# Patient Record
Sex: MALE | Race: WHITE | NOT HISPANIC OR LATINO | Employment: UNEMPLOYED | URBAN - METROPOLITAN AREA
[De-identification: names, ages, dates, MRNs, and addresses within clinical notes are randomized per-mention and may not be internally consistent; named-entity substitution may affect disease eponyms.]

---

## 2023-08-11 ENCOUNTER — APPOINTMENT (OUTPATIENT)
Dept: CARDIOLOGY | Facility: HOSPITAL | Age: 63
End: 2023-08-11
Payer: MEDICAID

## 2023-08-11 ENCOUNTER — HOSPITAL ENCOUNTER (INPATIENT)
Facility: HOSPITAL | Age: 63
LOS: 1 days | Discharge: HOME | End: 2023-08-12
Attending: INTERNAL MEDICINE | Admitting: INTERNAL MEDICINE
Payer: MEDICAID

## 2023-08-11 PROBLEM — I73.9 PAD (PERIPHERAL ARTERY DISEASE): Status: ACTIVE | Noted: 2023-08-11

## 2023-08-11 PROBLEM — I21.3 STEMI (ST ELEVATION MYOCARDIAL INFARCTION): Status: ACTIVE | Noted: 2023-08-11

## 2023-08-11 PROBLEM — I21.21 STEMI INVOLVING LEFT CIRCUMFLEX CORONARY ARTERY: Status: ACTIVE | Noted: 2023-08-11

## 2023-08-11 PROBLEM — F17.200 SMOKER: Status: ACTIVE | Noted: 2023-08-11

## 2023-08-11 PROBLEM — E78.2 MIXED HYPERLIPIDEMIA: Status: ACTIVE | Noted: 2023-08-11

## 2023-08-11 PROBLEM — I10 PRIMARY HYPERTENSION: Status: ACTIVE | Noted: 2023-08-11

## 2023-08-11 LAB
ACT BLD: 227 SECONDS (ref 89–137)
ACT BLD: 263 SECONDS (ref 89–137)
ALBUMIN SERPL-MCNC: 3.9 G/DL (ref 3.5–5.2)
ALBUMIN/GLOB SERPL: 1.2 G/DL
ALP SERPL-CCNC: 111 U/L (ref 39–117)
ALT SERPL W P-5'-P-CCNC: 41 U/L (ref 1–41)
ANION GAP SERPL CALCULATED.3IONS-SCNC: 11 MMOL/L (ref 5–15)
AST SERPL-CCNC: 200 U/L (ref 1–40)
BASOPHILS # BLD AUTO: 0 10*3/MM3 (ref 0–0.2)
BASOPHILS NFR BLD AUTO: 0.2 % (ref 0–1.5)
BILIRUB SERPL-MCNC: 0.4 MG/DL (ref 0–1.2)
BUN SERPL-MCNC: 11 MG/DL (ref 8–23)
BUN/CREAT SERPL: 10.2 (ref 7–25)
CALCIUM SPEC-SCNC: 9.3 MG/DL (ref 8.6–10.5)
CHLORIDE SERPL-SCNC: 102 MMOL/L (ref 98–107)
CHOLEST SERPL-MCNC: 136 MG/DL (ref 0–200)
CO2 SERPL-SCNC: 23 MMOL/L (ref 22–29)
CREAT SERPL-MCNC: 1.08 MG/DL (ref 0.76–1.27)
DEPRECATED RDW RBC AUTO: 49.4 FL (ref 37–54)
EGFRCR SERPLBLD CKD-EPI 2021: 76.6 ML/MIN/1.73
EOSINOPHIL # BLD AUTO: 0 10*3/MM3 (ref 0–0.4)
EOSINOPHIL NFR BLD AUTO: 0.1 % (ref 0.3–6.2)
ERYTHROCYTE [DISTWIDTH] IN BLOOD BY AUTOMATED COUNT: 14.6 % (ref 12.3–15.4)
GLOBULIN UR ELPH-MCNC: 3.3 GM/DL
GLUCOSE SERPL-MCNC: 122 MG/DL (ref 65–99)
HBA1C MFR BLD: 5.6 % (ref 4.8–5.6)
HCT VFR BLD AUTO: 40.9 % (ref 37.5–51)
HDLC SERPL-MCNC: 44 MG/DL (ref 40–60)
HGB BLD-MCNC: 13.5 G/DL (ref 13–17.7)
LDLC SERPL CALC-MCNC: 81 MG/DL (ref 0–100)
LDLC/HDLC SERPL: 1.87 {RATIO}
LYMPHOCYTES # BLD AUTO: 1.1 10*3/MM3 (ref 0.7–3.1)
LYMPHOCYTES NFR BLD AUTO: 8.5 % (ref 19.6–45.3)
MCH RBC QN AUTO: 32.1 PG (ref 26.6–33)
MCHC RBC AUTO-ENTMCNC: 32.9 G/DL (ref 31.5–35.7)
MCV RBC AUTO: 97.7 FL (ref 79–97)
MONOCYTES # BLD AUTO: 0.5 10*3/MM3 (ref 0.1–0.9)
MONOCYTES NFR BLD AUTO: 4.1 % (ref 5–12)
NEUTROPHILS NFR BLD AUTO: 11.5 10*3/MM3 (ref 1.7–7)
NEUTROPHILS NFR BLD AUTO: 87.1 % (ref 42.7–76)
NRBC BLD AUTO-RTO: 0 /100 WBC (ref 0–0.2)
PLATELET # BLD AUTO: 211 10*3/MM3 (ref 140–450)
PMV BLD AUTO: 9.6 FL (ref 6–12)
POTASSIUM SERPL-SCNC: 4.9 MMOL/L (ref 3.5–5.2)
PROT SERPL-MCNC: 7.2 G/DL (ref 6–8.5)
RBC # BLD AUTO: 4.19 10*6/MM3 (ref 4.14–5.8)
SODIUM SERPL-SCNC: 136 MMOL/L (ref 136–145)
TRIGL SERPL-MCNC: 48 MG/DL (ref 0–150)
TSH SERPL DL<=0.05 MIU/L-ACNC: 1.1 UIU/ML (ref 0.27–4.2)
VLDLC SERPL-MCNC: 11 MG/DL (ref 5–40)
WBC NRBC COR # BLD: 13.2 10*3/MM3 (ref 3.4–10.8)

## 2023-08-11 PROCEDURE — 80061 LIPID PANEL: CPT | Performed by: INTERNAL MEDICINE

## 2023-08-11 PROCEDURE — C1753 CATH, INTRAVAS ULTRASOUND: HCPCS | Performed by: INTERNAL MEDICINE

## 2023-08-11 PROCEDURE — B240ZZ3 ULTRASONOGRAPHY OF SINGLE CORONARY ARTERY, INTRAVASCULAR: ICD-10-PCS | Performed by: INTERNAL MEDICINE

## 2023-08-11 PROCEDURE — 92978 ENDOLUMINL IVUS OCT C 1ST: CPT | Performed by: INTERNAL MEDICINE

## 2023-08-11 PROCEDURE — 99153 MOD SED SAME PHYS/QHP EA: CPT | Performed by: INTERNAL MEDICINE

## 2023-08-11 PROCEDURE — C1894 INTRO/SHEATH, NON-LASER: HCPCS | Performed by: INTERNAL MEDICINE

## 2023-08-11 PROCEDURE — 25010000002 SULFUR HEXAFLUORIDE MICROSPH 60.7-25 MG RECONSTITUTED SUSPENSION: Performed by: INTERNAL MEDICINE

## 2023-08-11 PROCEDURE — C1769 GUIDE WIRE: HCPCS | Performed by: INTERNAL MEDICINE

## 2023-08-11 PROCEDURE — 83036 HEMOGLOBIN GLYCOSYLATED A1C: CPT | Performed by: INTERNAL MEDICINE

## 2023-08-11 PROCEDURE — 4A023N7 MEASUREMENT OF CARDIAC SAMPLING AND PRESSURE, LEFT HEART, PERCUTANEOUS APPROACH: ICD-10-PCS | Performed by: INTERNAL MEDICINE

## 2023-08-11 PROCEDURE — 25010000002 ATROPINE SULFATE 1 MG/10ML SOLUTION PREFILLED SYRINGE: Performed by: INTERNAL MEDICINE

## 2023-08-11 PROCEDURE — 25010000002 FUROSEMIDE PER 20 MG: Performed by: NURSE PRACTITIONER

## 2023-08-11 PROCEDURE — 85347 COAGULATION TIME ACTIVATED: CPT

## 2023-08-11 PROCEDURE — 93458 L HRT ARTERY/VENTRICLE ANGIO: CPT | Performed by: INTERNAL MEDICINE

## 2023-08-11 PROCEDURE — C1760 CLOSURE DEV, VASC: HCPCS | Performed by: INTERNAL MEDICINE

## 2023-08-11 PROCEDURE — 93306 TTE W/DOPPLER COMPLETE: CPT | Performed by: INTERNAL MEDICINE

## 2023-08-11 PROCEDURE — 99292 CRITICAL CARE ADDL 30 MIN: CPT | Performed by: INTERNAL MEDICINE

## 2023-08-11 PROCEDURE — B41F1ZZ FLUOROSCOPY OF RIGHT LOWER EXTREMITY ARTERIES USING LOW OSMOLAR CONTRAST: ICD-10-PCS | Performed by: INTERNAL MEDICINE

## 2023-08-11 PROCEDURE — C1874 STENT, COATED/COV W/DEL SYS: HCPCS | Performed by: INTERNAL MEDICINE

## 2023-08-11 PROCEDURE — 0 LIDOCAINE 1 % SOLUTION: Performed by: INTERNAL MEDICINE

## 2023-08-11 PROCEDURE — 93306 TTE W/DOPPLER COMPLETE: CPT

## 2023-08-11 PROCEDURE — 99152 MOD SED SAME PHYS/QHP 5/>YRS: CPT | Performed by: INTERNAL MEDICINE

## 2023-08-11 PROCEDURE — 25010000002 HEPARIN (PORCINE) PER 1000 UNITS: Performed by: INTERNAL MEDICINE

## 2023-08-11 PROCEDURE — 92943 PRQ TRLUML REVSC CH OCC ANT: CPT | Performed by: INTERNAL MEDICINE

## 2023-08-11 PROCEDURE — G0278 ILIAC ART ANGIO,CARDIAC CATH: HCPCS | Performed by: INTERNAL MEDICINE

## 2023-08-11 PROCEDURE — 80050 GENERAL HEALTH PANEL: CPT | Performed by: INTERNAL MEDICINE

## 2023-08-11 PROCEDURE — C9606 PERC D-E COR REVASC W AMI S: HCPCS | Performed by: INTERNAL MEDICINE

## 2023-08-11 PROCEDURE — C1725 CATH, TRANSLUMIN NON-LASER: HCPCS | Performed by: INTERNAL MEDICINE

## 2023-08-11 PROCEDURE — 25510000001 IOPAMIDOL PER 1 ML: Performed by: INTERNAL MEDICINE

## 2023-08-11 PROCEDURE — 99291 CRITICAL CARE FIRST HOUR: CPT | Performed by: INTERNAL MEDICINE

## 2023-08-11 PROCEDURE — 25010000002 PHENYLEPHRINE 10 MG/ML SOLUTION: Performed by: INTERNAL MEDICINE

## 2023-08-11 PROCEDURE — B2111ZZ FLUOROSCOPY OF MULTIPLE CORONARY ARTERIES USING LOW OSMOLAR CONTRAST: ICD-10-PCS | Performed by: INTERNAL MEDICINE

## 2023-08-11 PROCEDURE — 92941 PRQ TRLML REVSC TOT OCCL AMI: CPT | Performed by: INTERNAL MEDICINE

## 2023-08-11 PROCEDURE — 02713EZ DILATION OF CORONARY ARTERY, TWO ARTERIES WITH TWO INTRALUMINAL DEVICES, PERCUTANEOUS APPROACH: ICD-10-PCS | Performed by: INTERNAL MEDICINE

## 2023-08-11 PROCEDURE — C1887 CATHETER, GUIDING: HCPCS | Performed by: INTERNAL MEDICINE

## 2023-08-11 DEVICE — XIENCE SKYPOINT™ EVEROLIMUS ELUTING CORONARY STENT SYSTEM 3.00 MM X 23 MM / RAPID-EXCHANGE
Type: IMPLANTABLE DEVICE | Site: CORONARY | Status: FUNCTIONAL
Brand: XIENCE SKYPOINT™

## 2023-08-11 RX ORDER — ATROPINE SULFATE 0.1 MG/ML
INJECTION INTRAVENOUS
Status: DISCONTINUED | OUTPATIENT
Start: 2023-08-11 | End: 2023-08-11 | Stop reason: HOSPADM

## 2023-08-11 RX ORDER — DIPHENHYDRAMINE HCL 25 MG
25 CAPSULE ORAL EVERY 6 HOURS PRN
Status: DISCONTINUED | OUTPATIENT
Start: 2023-08-11 | End: 2023-08-12 | Stop reason: HOSPADM

## 2023-08-11 RX ORDER — PHENYLEPHRINE HYDROCHLORIDE 10 MG/ML
INJECTION INTRAVENOUS
Status: DISCONTINUED | OUTPATIENT
Start: 2023-08-11 | End: 2023-08-11 | Stop reason: HOSPADM

## 2023-08-11 RX ORDER — NITROGLYCERIN 0.4 MG/1
0.4 TABLET SUBLINGUAL
Status: DISCONTINUED | OUTPATIENT
Start: 2023-08-11 | End: 2023-08-12 | Stop reason: HOSPADM

## 2023-08-11 RX ORDER — ONDANSETRON 4 MG/1
4 TABLET, FILM COATED ORAL EVERY 6 HOURS PRN
Status: DISCONTINUED | OUTPATIENT
Start: 2023-08-11 | End: 2023-08-12 | Stop reason: HOSPADM

## 2023-08-11 RX ORDER — ONDANSETRON 2 MG/ML
4 INJECTION INTRAMUSCULAR; INTRAVENOUS EVERY 6 HOURS PRN
Status: DISCONTINUED | OUTPATIENT
Start: 2023-08-11 | End: 2023-08-12 | Stop reason: HOSPADM

## 2023-08-11 RX ORDER — LIDOCAINE HYDROCHLORIDE 10 MG/ML
INJECTION, SOLUTION INFILTRATION; PERINEURAL
Status: DISCONTINUED | OUTPATIENT
Start: 2023-08-11 | End: 2023-08-11 | Stop reason: HOSPADM

## 2023-08-11 RX ORDER — ROSUVASTATIN CALCIUM 10 MG/1
20 TABLET, COATED ORAL NIGHTLY
Status: DISCONTINUED | OUTPATIENT
Start: 2023-08-11 | End: 2023-08-12 | Stop reason: HOSPADM

## 2023-08-11 RX ORDER — ASPIRIN 81 MG/1
81 TABLET, CHEWABLE ORAL DAILY
Status: DISCONTINUED | OUTPATIENT
Start: 2023-08-11 | End: 2023-08-12 | Stop reason: HOSPADM

## 2023-08-11 RX ORDER — NAPROXEN 500 MG/1
500 TABLET ORAL 2 TIMES DAILY WITH MEALS
COMMUNITY
End: 2023-08-12 | Stop reason: HOSPADM

## 2023-08-11 RX ORDER — HEPARIN SODIUM 1000 [USP'U]/ML
INJECTION, SOLUTION INTRAVENOUS; SUBCUTANEOUS
Status: DISCONTINUED | OUTPATIENT
Start: 2023-08-11 | End: 2023-08-11 | Stop reason: HOSPADM

## 2023-08-11 RX ORDER — FUROSEMIDE 10 MG/ML
40 INJECTION INTRAMUSCULAR; INTRAVENOUS DAILY
Status: DISCONTINUED | OUTPATIENT
Start: 2023-08-11 | End: 2023-08-12

## 2023-08-11 RX ORDER — METOPROLOL SUCCINATE 25 MG/1
25 TABLET, EXTENDED RELEASE ORAL
Status: DISCONTINUED | OUTPATIENT
Start: 2023-08-11 | End: 2023-08-12 | Stop reason: HOSPADM

## 2023-08-11 RX ORDER — ACETAMINOPHEN 325 MG/1
650 TABLET ORAL EVERY 4 HOURS PRN
Status: DISCONTINUED | OUTPATIENT
Start: 2023-08-11 | End: 2023-08-12 | Stop reason: HOSPADM

## 2023-08-11 RX ORDER — SODIUM CHLORIDE 9 MG/ML
INJECTION, SOLUTION INTRAVENOUS
Status: DISCONTINUED | OUTPATIENT
Start: 2023-08-11 | End: 2023-08-11

## 2023-08-11 RX ADMIN — TICAGRELOR 90 MG: 90 TABLET ORAL at 20:21

## 2023-08-11 RX ADMIN — ROSUVASTATIN 20 MG: 10 TABLET, FILM COATED ORAL at 20:21

## 2023-08-11 RX ADMIN — ASPIRIN 81 MG CHEWABLE TABLET 81 MG: 81 TABLET CHEWABLE at 08:48

## 2023-08-11 RX ADMIN — SULFUR HEXAFLUORIDE 2 ML: KIT at 07:54

## 2023-08-11 RX ADMIN — FUROSEMIDE 40 MG: 10 INJECTION, SOLUTION INTRAMUSCULAR; INTRAVENOUS at 15:49

## 2023-08-11 RX ADMIN — METOPROLOL SUCCINATE 25 MG: 25 TABLET, EXTENDED RELEASE ORAL at 15:49

## 2023-08-11 NOTE — Clinical Note
Hemostasis started on the left femoral artery. Angio-Seal was used in achieving hemostasis. Closure device deployed in the vessel and manual pressure applied to vessel. Manual pressure was held by MD MARIALUISA. Manual pressure was held for 5 min. Hemostasis achieved successfully.

## 2023-08-11 NOTE — CASE MANAGEMENT/SOCIAL WORK
Discharge Planning Assessment   Dov     Patient Name: Eber Garza  MRN: [id number]  Today's Date: 8/11/2023    Admit Date: 8/11/2023    Plan: Anticipate return home with family.   Discharge Needs Assessment       Row Name 08/11/23 1401       Living Environment    People in Home grandchild(nino)    Current Living Arrangements home    Potentially Unsafe Housing Conditions none    Primary Care Provided by self    Provides Primary Care For no one    Family Caregiver if Needed child(nino), adult    Quality of Family Relationships supportive    Able to Return to Prior Arrangements yes       Resource/Environmental Concerns    Resource/Environmental Concerns none    Transportation Concerns none       Transition Planning    Patient/Family Anticipates Transition to home with family    Patient/Family Anticipated Services at Transition none    Transportation Anticipated family or friend will provide       Discharge Needs Assessment    Readmission Within the Last 30 Days no previous admission in last 30 days    Equipment Currently Used at Home none    Concerns to be Addressed discharge planning    Anticipated Changes Related to Illness none    Equipment Needed After Discharge none                   Discharge Plan       Row Name 08/11/23 1402       Plan    Plan Anticipate return home with family.    Patient/Family in Agreement with Plan yes    Plan Comments Met with pt in room regarding d/c planning. Pt lives at home with his grandson. Pt is IADLs and drives. Denies use of DME. Pt son or grandson will provide transportation at d/c. Pt is uninsured and without PCP. MSW notified.                  Continued Care and Services - Admitted Since 8/11/2023           Expected Discharge Date and Time       Expected Discharge Date Expected Discharge Time    Aug 14, 2023                Functional Status       Row Name 08/11/23 1401       Functional Status    Usual Activity Tolerance good    Current Activity Tolerance good       Functional  Status, IADL    Medications independent    Meal Preparation independent    Housekeeping independent    Laundry independent    Shopping independent       Mental Status    General Appearance WDL WDL       Mental Status Summary    Recent Changes in Mental Status/Cognitive Functioning no changes                            Sera Mendoza RN

## 2023-08-11 NOTE — Clinical Note
First balloon inflation max pressure = 14 chapincito. First balloon inflation duration = 12 seconds.

## 2023-08-11 NOTE — CASE MANAGEMENT/SOCIAL WORK
Social Work Assessment  [facility]     Patient Name: Eber Garza  MRN: [id number]  Today's Date: 8/11/2023    Admit Date: 8/11/2023     Demographic Summary       Row Name 08/11/23 0529       General Information    Reason for Consult care coordination/care conference;insurance concerns;medication concerns    General Information Comments SW was consulted re: no health coverage. MNÓICA sent EM to MA with request to screen and it appears pt is eligible for MKD, application is pending receipt of docs. Pt informed this writer his son will gather docs and bring to hospital. SW instructed pt to notify the nurse when they are available so MA can be contacted, pt verbalized understanding. Pt denies having a PCP and reports interest in an appt with Yee Jimenez; however, due to no health coverage, is agreeable for [facility] at this time. Appt made and placed on AVS. Pt is unsure of his ability to afford d/c meds - pt agreeable to [facility], pharmacy updated in [facility], notified of possible need for financial assistance. Pt is unemployed, receives survivor's benefits. He drives, but his son will provide transportation at discharge. In re: to smoking in the setting of MI - pt reports smoking 1ppd, having started at 13 y.o. He wants to quit on his own but is aware of [employer] resource, if needed.           LAVELLE Sarmiento, \[facility] [place] [facility]\""  Medical Social Worker  Ph [phone]  Fax [phone]  [email or web address] Flag

## 2023-08-11 NOTE — Clinical Note
Hemostasis started on the right femoral artery. Manual pressure applied to vessel. Manual pressure was held by MD MARIALUISA. Manual pressure was held for 5 min. Hemostasis achieved successfully.

## 2023-08-11 NOTE — H&P
Referring Provider: Francisco Moreno MD    This is a admission history and physical      SUBJECTIVE     Chief Complaint: Chest pain/STEMI    History of present illness:  Eber Garza is a 62 y.o. male with no known medical history other than hypertension for which he takes lisinopril and significant family history of heart disease in his father who presented to the outside hospital with complaints of chest pain and was diagnosed with lateral STEMI.  He also has ischemic changes in the anterior and inferior leads.  He was emergently transferred to [facility] where an urgent discussion was held with the patient.  We emergently proceeded with cardiac catheterization and primary PCI.    Review of systems:    Constitutional: No weakness, fatigue, fever, rigors, chills   Eyes: No vision changes, eye pain   ENT/oropharynx: No difficulty swallowing, sore throat, epistaxis, changes in hearing   Cardiovascular: + chest pain, chest tightness, palpitations, paroxysmal nocturnal dyspnea, orthopnea, diaphoresis, dizziness / syncopal episode   Respiratory: No shortness of breath, + dyspnea on exertion, cough, wheezing, hemoptysis   Gastrointestinal: No abdominal pain, nausea, vomiting, diarrhea, bloody stools   Genitourinary: No hematuria, dysuria   Neurological: No headache, tremors, numbness, one-sided weakness    Musculoskeletal: No cramps, myalgias, joint pain, joint swelling   Integument: No rash, edema        Personal History:        Past medical history  Hypertension    Past surgical history  Right hip fracture repair    Family history  Father  of heart heart attack     Home medications  Lisinopril     Allergies:     Patient has no known allergies.    Scheduled Meds:  Continuous Infusions:     PRN Meds:      OBJECTIVE    Vital Signs  Vitals:    23 0552 23 0617 23 0623 23 0639   BP: 135/82 115/73 (!) 86/49 108/72   Pulse: 84 69 (!) 43 118   Resp: 20   15   SpO2: 93% 95% 100% 95%   Weight: 68 kg  "(150 lb)      Height: 175.3 cm (69\")              No intake or output data in the 24 hours ending 08/11/23 0703     Telemetry: Sinus bradycardia    Physical Exam:  The patient is alert, oriented and in no distress.  Vital signs as noted above.  Head and neck revealed no carotid bruits or jugular venous distention.  No thyromegaly or lymphadenopathy is present  Lungs clear.  No wheezing.  Breath sounds are normal bilaterally.  Heart: Normal first and second heart sounds. No murmur.  No precordial rub is present.  No gallop is present.  Abdomen: Soft and nontender.  No organomegaly is present.  Extremities with good peripheral pulses without any pedal edema.  Skin: Warm and dry.  Musculoskeletal system is grossly normal.  CNS grossly normal.       Results Review:  I have personally reviewed the results from the time of this admission to 8/11/2023 07:03 EDT and agree with these findings:  []  Laboratory  []  Microbiology  []  Radiology  []  EKG/Telemetry   []  Cardiology/Vascular   []  Pathology  []  Old records  []  Other:    Most notable findings include:     Lab Results (last 24 hours)       ** No results found for the last 24 hours. **            Imaging Results (Last 24 Hours)       ** No results found for the last 24 hours. **            LAB RESULTS (LAST 7 DAYS)    CBC        BMP        CMP         BNP        TROPONIN        CoAg        Creatinine Clearance  CrCl cannot be calculated (No successful lab value found.).    ABG          Radiology  No radiology results for the last day      EKG  I personally viewed and interpreted the patient's EKG/Telemetry data:  No orders to display         Echocardiogram:          Stress Test:        Cardiac Catheterization:  No results found for this or any previous visit.        Other:      ASSESSMENT & PLAN:    Principal Problem:    STEMI (ST elevation myocardial infarction)  Active Problems:    STEMI involving left circumflex coronary artery    Primary hypertension    Mixed " hyperlipidemia    PAD (peripheral artery disease)    Smoker    STEMI/coronary artery disease  Patient presented with acute ST elevation MI involving the lateral leads with ischemic changes in anterior and inferior leads.  After emergent discussion about risk and benefit of the procedure he was taken to the Cath Lab.  Cardiac catheterization showed 100% occlusion of LAD and circumflex.  IVUS guided PCI and BRIT placement to left circumflex was successfully performed.  Unsuccessful attempt towards  PCI of LAD which receives collaterals from the RCA.  He will be transferred to CCU  We will start dual antiplatelet therapy.  Start high intensity statin.  Obtain an echocardiogram.  We will start beta-blocker and ACE inhibitor depending on his heart rate and blood pressure.  Of note during the cardiac catheterization he became hypotensive and bradycardic requiring phenylephrine and atropine injections.  He will need cardiac rehab at the time of discharge    Hypertension  Typically takes lisinopril.  We will restart depending on his blood pressure response.    Hyperlipidemia  Start high intensity statin.  Obtain a lipid panel.    Peripheral arterial disease  During cardiac catheterization it was noted that he has complete occlusion of right common iliac.  Will need to follow-up with vascular surgery regarding possible revascularization options as the patient does complain of claudications.  In the meantime he will continue dual antiplatelet therapy, high intensity statin.    Smoker  He has been smoking a pack a day for more than 40 years.  Smoking cessation counseling provided to the patient    Addendum:  Echocardiogram shows EF of less than 20% with severely dilated left ventricle.  He also has moderate to severe mitral regurgitation.  Start IV diuretics.  Start Toprol-XL.  Monitor I's and O's and replace electrolytes as needed.  Continue telemonitoring for episodes of bradycardia while on Toprol-XL.  Will start GDMT  depending on his progress    Critical care time 90 minutes.  This time includes emergent evaluation of patient in the emergency room and discussion regarding risk and benefit of emergent cardiac catheterization and PCI.  This also involves dealing with high risk medications including heparin, Integrilin.  This time includes postprocedure management of the patient which includes administering atropine for marked bradycardia and giving phenylephrine for marked hypotension postprocedure.  Postprocedure management of the patient in the CCU is also included in this time.    Francisco Moreno MD  08/11/23  07:03 EDT

## 2023-08-11 NOTE — PAYOR COMM NOTE
"  PRESUMPTIVE ELIGIBILITY PATIENT - NOTIFICATION OF ADMISSION  ---- Resubmission - [facility] unable to find in system previously and returned fax submission x 2..      Ching Lombardo (62 y.o. Male)       Date of Birth   1960    Social Security Number       Address   [address] [place] [address] [address] [place] [address]    Home Phone   [phone]    MRN   [id number]       Mu-ism   None    Marital Status   Significant Other                            Admission Date   8/11/23    Admission Type   Elective    Admitting Provider   Francisco Moreno MD    Attending Provider   Francisco Moreno MD    Department, Room/Bed   [facility] CARDIOVASCULAR CARE UNIT, 2206/1       Discharge Date       Discharge Disposition       Discharge Destination                                 Attending Provider: Francisco Moreno MD    Allergies: No Known Allergies    Isolation: None   Infection: None   Code Status: CPR    Ht: 175.3 cm (69\")   Wt: 68 kg (150 lb)    Admission Cmt: None   Principal Problem: STEMI (ST elevation myocardial infarction) [I21.3]                   Active Insurance as of 8/11/2023       Primary Coverage       Payor Plan Insurance Group Employer/Plan Group    [place]        Payor Plan Address Payor Plan Phone Number Payor Plan Fax Number Effective Dates    [address]   8/11/2023 - None Entered    [place] [address]         Subscriber Name Subscriber Birth Date Member ID       CHING LOMBADRO 1960 [id number]                   "

## 2023-08-11 NOTE — PLAN OF CARE
Problem: Adult Inpatient Plan of Care  Goal: Plan of Care Review  Outcome: Ongoing, Progressing  Flowsheets (Taken 8/11/2023 1346)  Plan of Care Reviewed With: patient  Goal: Patient-Specific Goal (Individualized)  Outcome: Ongoing, Progressing  Goal: Absence of Hospital-Acquired Illness or Injury  Outcome: Ongoing, Progressing  Intervention: Identify and Manage Fall Risk  Recent Flowsheet Documentation  Taken 8/11/2023 1315 by Juliet Christian RN  Safety Promotion/Fall Prevention: safety round/check completed  Taken 8/11/2023 1228 by Juliet Christian RN  Safety Promotion/Fall Prevention: safety round/check completed  Taken 8/11/2023 1130 by Juliet Christian RN  Safety Promotion/Fall Prevention: safety round/check completed  Taken 8/11/2023 1015 by Juliet Christian RN  Safety Promotion/Fall Prevention: safety round/check completed  Taken 8/11/2023 0915 by Juliet Christian RN  Safety Promotion/Fall Prevention: safety round/check completed  Taken 8/11/2023 0815 by Juliet Christian RN  Safety Promotion/Fall Prevention: safety round/check completed  Intervention: Prevent Skin Injury  Recent Flowsheet Documentation  Taken 8/11/2023 1315 by Juliet Christian RN  Body Position: position changed independently  Taken 8/11/2023 1228 by Juliet Christian RN  Body Position: position changed independently  Taken 8/11/2023 1130 by Juliet Christian RN  Body Position: position changed independently  Taken 8/11/2023 1015 by Juliet Christian RN  Body Position: position changed independently  Taken 8/11/2023 0915 by Juliet Christian RN  Body Position: position changed independently  Intervention: Prevent and Manage VTE (Venous Thromboembolism) Risk  Recent Flowsheet Documentation  Taken 8/11/2023 1228 by Juliet Christain RN  Range of Motion: active ROM (range of motion) encouraged  Taken 8/11/2023 1130 by Juliet Christian RN  Activity Management: ambulated to bathroom  Taken 8/11/2023 1015 by Juliet Christian RN  Activity Management: up in  chair  Taken 8/11/2023 0915 by Juliet Christian, RN  Activity Management: up in chair  Intervention: Prevent Infection  Recent Flowsheet Documentation  Taken 8/11/2023 1228 by Juliet Christian RN  Infection Prevention: hand hygiene promoted  Goal: Optimal Comfort and Wellbeing  Outcome: Ongoing, Progressing  Intervention: Provide Person-Centered Care  Recent Flowsheet Documentation  Taken 8/11/2023 1228 by Juliet Christian RN  Trust Relationship/Rapport: care explained  Taken 8/11/2023 0815 by Juliet Christian RN  Trust Relationship/Rapport: care explained  Goal: Readiness for Transition of Care  Outcome: Ongoing, Progressing  Intervention: Mutually Develop Transition Plan  Recent Flowsheet Documentation  Taken 8/11/2023 1105 by Juliet Christian RN  Equipment Currently Used at Home: none  Transportation Anticipated: family or friend will provide  Patient/Family Anticipated Services at Transition: none  Patient/Family Anticipates Transition to: home with family     Problem: Asthma Comorbidity  Goal: Maintenance of Asthma Control  Outcome: Ongoing, Progressing     Problem: Behavioral Health Comorbidity  Goal: Maintenance of Behavioral Health Symptom Control  Outcome: Ongoing, Progressing     Problem: COPD (Chronic Obstructive Pulmonary Disease) Comorbidity  Goal: Maintenance of COPD Symptom Control  Outcome: Ongoing, Progressing     Problem: Diabetes Comorbidity  Goal: Blood Glucose Level Within Targeted Range  Outcome: Ongoing, Progressing     Problem: Heart Failure Comorbidity  Goal: Maintenance of Heart Failure Symptom Control  Outcome: Ongoing, Progressing     Problem: Hypertension Comorbidity  Goal: Blood Pressure in Desired Range  Outcome: Ongoing, Progressing     Problem: Obstructive Sleep Apnea Risk or Actual Comorbidity Management  Goal: Unobstructed Breathing During Sleep  Outcome: Ongoing, Progressing     Problem: Osteoarthritis Comorbidity  Goal: Maintenance of Osteoarthritis Symptom Control  Outcome: Ongoing,  Progressing  Intervention: Maintain Osteoarthritis Symptom Control  Recent Flowsheet Documentation  Taken 8/11/2023 1130 by Juliet Christian RN  Activity Management: ambulated to bathroom  Taken 8/11/2023 1015 by Juliet Christian RN  Activity Management: up in chair  Taken 8/11/2023 0915 by Juliet Christian RN  Activity Management: up in chair     Problem: Pain Chronic (Persistent) (Comorbidity Management)  Goal: Acceptable Pain Control and Functional Ability  Outcome: Ongoing, Progressing  Intervention: Optimize Psychosocial Wellbeing  Recent Flowsheet Documentation  Taken 8/11/2023 1228 by Juliet Christian RN  Diversional Activities: television  Family/Support System Care: support provided  Taken 8/11/2023 0815 by Juliet Christian RN  Diversional Activities: television  Family/Support System Care: support provided     Problem: Seizure Disorder Comorbidity  Goal: Maintenance of Seizure Control  Outcome: Ongoing, Progressing   Goal Outcome Evaluation:  Plan of Care Reviewed With: patient         Patient has had no complaints of chest pain. Heart cath dressing is clean, dry, and intact.

## 2023-08-11 NOTE — Clinical Note
IVUS Procedure End Detail Level: Detailed General Sunscreen Counseling: I recommended a broad spectrum sunscreen with a SPF of 30 or higher.  I explained that SPF 30 sunscreens block approximately 97 percent of the sun's harmful rays.  Sunscreens should be applied at least 15 minutes prior to expected sun exposure and then every 2 hours after that as long as sun exposure continues. If swimming or exercising sunscreen should be reapplied every 45 minutes to an hour after getting wet or sweating.  One ounce, or the equivalent of a shot glass full of sunscreen, is adequate to protect the skin not covered by a bathing suit. I also recommended a lip balm with a sunscreen as well. Sun protective clothing can be used in lieu of sunscreen but must be worn the entire time you are exposed to the sun's rays.

## 2023-08-11 NOTE — Clinical Note
First balloon inflation max pressure = 12 chapincito. First balloon inflation duration = 3 seconds. Second inflation of balloon - Max pressure = 10 chapincito. 2nd Inflation of balloon - Duration = 3 seconds.

## 2023-08-11 NOTE — Clinical Note
Allergies reviewed.  H&P note has been confirmed for the patient. Staff has reviewed the patient's labs.

## 2023-08-11 NOTE — Clinical Note
A 6 fr sheath was  inserted using micropuncture technique with ultrasound guidance into the left femoral artery. Sheath insertion not delayed.

## 2023-08-12 VITALS
HEIGHT: 69 IN | RESPIRATION RATE: 17 BRPM | TEMPERATURE: 98.7 F | HEART RATE: 78 BPM | WEIGHT: 150 LBS | DIASTOLIC BLOOD PRESSURE: 60 MMHG | BODY MASS INDEX: 22.22 KG/M2 | OXYGEN SATURATION: 95 % | SYSTOLIC BLOOD PRESSURE: 91 MMHG

## 2023-08-12 PROBLEM — I50.23 ACUTE ON CHRONIC HFREF (HEART FAILURE WITH REDUCED EJECTION FRACTION): Status: ACTIVE | Noted: 2023-08-12

## 2023-08-12 PROBLEM — I50.23 ACUTE ON CHRONIC SYSTOLIC HEART FAILURE: Status: ACTIVE | Noted: 2023-08-12

## 2023-08-12 LAB
ANION GAP SERPL CALCULATED.3IONS-SCNC: 11 MMOL/L (ref 5–15)
BUN SERPL-MCNC: 16 MG/DL (ref 8–23)
BUN/CREAT SERPL: 14.8 (ref 7–25)
CALCIUM SPEC-SCNC: 8.9 MG/DL (ref 8.6–10.5)
CHLORIDE SERPL-SCNC: 104 MMOL/L (ref 98–107)
CHOLEST SERPL-MCNC: 124 MG/DL (ref 0–200)
CO2 SERPL-SCNC: 24 MMOL/L (ref 22–29)
CREAT SERPL-MCNC: 1.08 MG/DL (ref 0.76–1.27)
DEPRECATED RDW RBC AUTO: 50.8 FL (ref 37–54)
EGFRCR SERPLBLD CKD-EPI 2021: 76.6 ML/MIN/1.73
ERYTHROCYTE [DISTWIDTH] IN BLOOD BY AUTOMATED COUNT: 14.7 % (ref 12.3–15.4)
GEN 5 2HR TROPONIN T REFLEX: 6624 NG/L
GLUCOSE SERPL-MCNC: 98 MG/DL (ref 65–99)
HCT VFR BLD AUTO: 36.6 % (ref 37.5–51)
HDLC SERPL-MCNC: 42 MG/DL (ref 40–60)
HGB BLD-MCNC: 12.5 G/DL (ref 13–17.7)
LDLC SERPL CALC-MCNC: 65 MG/DL (ref 0–100)
LDLC/HDLC SERPL: 1.53 {RATIO}
MCH RBC QN AUTO: 32.1 PG (ref 26.6–33)
MCHC RBC AUTO-ENTMCNC: 34.1 G/DL (ref 31.5–35.7)
MCV RBC AUTO: 93.9 FL (ref 79–97)
PA ADP PRP-ACNC: 101 PRU (ref 194–418)
PLATELET # BLD AUTO: 199 10*3/MM3 (ref 140–450)
PMV BLD AUTO: 9.5 FL (ref 6–12)
POTASSIUM SERPL-SCNC: 3.6 MMOL/L (ref 3.5–5.2)
QT INTERVAL: 418 MS
RBC # BLD AUTO: 3.89 10*6/MM3 (ref 4.14–5.8)
SODIUM SERPL-SCNC: 139 MMOL/L (ref 136–145)
TRIGL SERPL-MCNC: 88 MG/DL (ref 0–150)
TROPONIN T DELTA: -845 NG/L
TROPONIN T SERPL HS-MCNC: 7469 NG/L
VLDLC SERPL-MCNC: 17 MG/DL (ref 5–40)
WBC NRBC COR # BLD: 10.8 10*3/MM3 (ref 3.4–10.8)

## 2023-08-12 PROCEDURE — 97162 PT EVAL MOD COMPLEX 30 MIN: CPT

## 2023-08-12 PROCEDURE — 85576 BLOOD PLATELET AGGREGATION: CPT | Performed by: INTERNAL MEDICINE

## 2023-08-12 PROCEDURE — 84484 ASSAY OF TROPONIN QUANT: CPT | Performed by: INTERNAL MEDICINE

## 2023-08-12 PROCEDURE — 93010 ELECTROCARDIOGRAM REPORT: CPT | Performed by: INTERNAL MEDICINE

## 2023-08-12 PROCEDURE — 80061 LIPID PANEL: CPT | Performed by: INTERNAL MEDICINE

## 2023-08-12 PROCEDURE — 99239 HOSP IP/OBS DSCHRG MGMT >30: CPT | Performed by: INTERNAL MEDICINE

## 2023-08-12 PROCEDURE — 80048 BASIC METABOLIC PNL TOTAL CA: CPT | Performed by: INTERNAL MEDICINE

## 2023-08-12 PROCEDURE — 25010000002 FUROSEMIDE PER 20 MG: Performed by: NURSE PRACTITIONER

## 2023-08-12 PROCEDURE — 85027 COMPLETE CBC AUTOMATED: CPT | Performed by: INTERNAL MEDICINE

## 2023-08-12 PROCEDURE — 93005 ELECTROCARDIOGRAM TRACING: CPT | Performed by: INTERNAL MEDICINE

## 2023-08-12 RX ORDER — ACETAMINOPHEN 325 MG/1
650 TABLET ORAL EVERY 4 HOURS PRN
Qty: 90 TABLET | Refills: 0 | Status: SHIPPED | OUTPATIENT
Start: 2023-08-12

## 2023-08-12 RX ORDER — ROSUVASTATIN CALCIUM 20 MG/1
20 TABLET, COATED ORAL NIGHTLY
Qty: 90 TABLET | Refills: 3 | Status: SHIPPED | OUTPATIENT
Start: 2023-08-12

## 2023-08-12 RX ORDER — FUROSEMIDE 40 MG/1
40 TABLET ORAL DAILY
Status: DISCONTINUED | OUTPATIENT
Start: 2023-08-13 | End: 2023-08-12 | Stop reason: HOSPADM

## 2023-08-12 RX ORDER — NITROGLYCERIN 0.4 MG/1
0.4 TABLET SUBLINGUAL
Qty: 100 TABLET | Refills: 12 | Status: SHIPPED | OUTPATIENT
Start: 2023-08-12

## 2023-08-12 RX ORDER — ASPIRIN 81 MG/1
81 TABLET, CHEWABLE ORAL DAILY
Qty: 90 TABLET | Refills: 3 | Status: SHIPPED | OUTPATIENT
Start: 2023-08-13

## 2023-08-12 RX ORDER — METOPROLOL SUCCINATE 25 MG/1
25 TABLET, EXTENDED RELEASE ORAL
Qty: 90 TABLET | Refills: 3 | Status: SHIPPED | OUTPATIENT
Start: 2023-08-13

## 2023-08-12 RX ORDER — FUROSEMIDE 40 MG/1
40 TABLET ORAL DAILY
Qty: 90 TABLET | Refills: 3 | Status: SHIPPED | OUTPATIENT
Start: 2023-08-13

## 2023-08-12 RX ADMIN — TICAGRELOR 90 MG: 90 TABLET ORAL at 08:36

## 2023-08-12 RX ADMIN — EMPAGLIFLOZIN 10 MG: 10 TABLET, FILM COATED ORAL at 12:18

## 2023-08-12 RX ADMIN — ASPIRIN 81 MG CHEWABLE TABLET 81 MG: 81 TABLET CHEWABLE at 08:36

## 2023-08-12 RX ADMIN — FUROSEMIDE 40 MG: 10 INJECTION, SOLUTION INTRAMUSCULAR; INTRAVENOUS at 08:36

## 2023-08-12 RX ADMIN — METOPROLOL SUCCINATE 25 MG: 25 TABLET, EXTENDED RELEASE ORAL at 08:36

## 2023-08-12 NOTE — SIGNIFICANT NOTE
Patient fitted with life vest. Discharge instructions given to patient. Meds given to patient. Patient verbalized and understanding.    08/12/23 1802   Discharge of Care   Discharge Mode wheel chair   Discharged Accompanied by family member/friend   Discharge Teaching Done  Yes   Learning Method Teach Back;Written Materials;Explanation

## 2023-08-12 NOTE — THERAPY EVALUATION
Patient Name: Eber Garza  : 1960    MRN: [id number]                              Today's Date: 2023       Admit Date: 2023    Visit Dx: No diagnosis found.  Patient Active Problem List   Diagnosis    STEMI involving left circumflex coronary artery    Primary hypertension    Mixed hyperlipidemia    PAD (peripheral artery disease)    Smoker    STEMI (ST elevation myocardial infarction)    Acute on chronic systolic heart failure    Acute on chronic HFrEF (heart failure with reduced ejection fraction)     History reviewed. No pertinent past medical history.  Past Surgical History:   Procedure Laterality Date    BACK SURGERY      FRACTURE SURGERY        General Information       Row Name 23 1405          Physical Therapy Time and Intention    Document Type evaluation  -EL     Mode of Treatment individual therapy;physical therapy  -       Row Name 23 1405          General Information    Patient Profile Reviewed yes  -EL     Prior Level of Function independent:;all household mobility;ADL's;driving  -       Row Name 23 1405          Living Environment    People in Home child(nino), adult;grandchild(nino)  -       Row Name 23 1405          Home Main Entrance    Number of Stairs, Main Entrance four  -EL     Stair Railings, Main Entrance railings safe and in good condition  -       Row Name 23 1405          Stairs Within Home, Primary    Number of Stairs, Within Home, Primary one  -EL       Row Name 23 1405          Cognition    Orientation Status (Cognition) oriented x 4  -EL               User Key  (r) = Recorded By, (t) = Taken By, (c) = Cosigned By      Initials Name Provider Type    Jonas Collins PT Physical Therapist                   Mobility       Row Name 23 1406          Bed Mobility    Bed Mobility bed mobility (all) activities  -EL     All Activities, [place] (Bed Mobility) independent  -       Row Name 23 1406          Sit-Stand  Transfer    Sit-Stand [place] (Transfers) standby assist  -EL       Row Name 08/12/23 1406          Gait/Stairs (Locomotion)    [place] Level (Gait) standby assist  -EL     Distance in Feet (Gait) 150  -EL     Comment, (Gait/Stairs) No significant gait deficits  -EL               User Key  (r) = Recorded By, (t) = Taken By, (c) = Cosigned By      Initials Name Provider Type    Jonas Collins PT Physical Therapist                   Obj/Interventions       Row Name 08/12/23 1407          Range of Motion Comprehensive    General Range of Motion bilateral lower extremity ROM WFL  -EL       Row Name 08/12/23 1407          Strength Comprehensive (MMT)    General Manual Muscle Testing (MMT) Assessment no strength deficits identified  -EL       Row Name 08/12/23 1407          Balance    Balance Assessment sitting static balance;standing static balance;standing dynamic balance  -EL     Static Sitting Balance independent  -EL     Static Standing Balance standby assist  -EL     Dynamic Standing Balance standby assist  -EL       Row Name 08/12/23 1407          Sensory Assessment (Somatosensory)    Sensory Assessment (Somatosensory) other (see comments)  reports chronic n/t in R foot  -EL               User Key  (r) = Recorded By, (t) = Taken By, (c) = Cosigned By      Initials Name Provider Type    Jonas Collins, MATHEW Physical Therapist                   Goals/Plan    No documentation.                  Clinical Impression       Row Name 08/12/23 1408          Pain    Pretreatment Pain Rating 0/10 - no pain  -EL     Posttreatment Pain Rating 0/10 - no pain  -EL       Row Name 08/12/23 1408          Plan of Care Review    Plan of Care Reviewed With patient  -EL     Outcome Evaluation Pt is a 61 YO M admitted with STEMI, with cardiac cath yesterday. Pt reports no pain at groin site this date. Pt states he lives at home with son and grandson, typically is independent with all ADLs, ambulation without AD and no recent falls.  Pt this date demonstrates good mobility, performing bed mobility, transfers and ambulation without physical assistance. Pt appears near baseline mobility and does not require PT at this time. PT to sign off and will require new orders if status changes.  -EL       Row Name 08/12/23 1408          Therapy Assessment/Plan (PT)    Criteria for Skilled Interventions Met (PT) no problems identified which require skilled intervention  -EL     Therapy Frequency (PT) evaluation only  -EL       Row Name 08/12/23 1408          Vital Signs    O2 Delivery Pre Treatment room air  -EL     O2 Delivery Intra Treatment room air  -EL     O2 Delivery Post Treatment room air  -EL       Row Name 08/12/23 1408          Positioning and Restraints    Pre-Treatment Position in bed  -EL     Post Treatment Position bed  -EL     In Bed notified nsg;supine;call light within reach;encouraged to call for assist;exit alarm on  -EL               User Key  (r) = Recorded By, (t) = Taken By, (c) = Cosigned By      Initials Name Provider Type    Jonas Collins PT Physical Therapist                   Outcome Measures       Row Name 08/12/23 1414          How much help from another person do you currently need...    Turning from your back to your side while in flat bed without using bedrails? 4  -EL     Moving from lying on back to sitting on the side of a flat bed without bedrails? 4  -EL     Moving to and from a bed to a chair (including a wheelchair)? 4  -EL     Standing up from a chair using your arms (e.g., wheelchair, bedside chair)? 4  -EL     Climbing 3-5 steps with a railing? 4  -EL     To walk in hospital room? 4  -EL     AM-PAC 6 Clicks Score (PT) 24  -EL     Highest level of mobility 8 --> Walked 250 feet or more  -EL               User Key  (r) = Recorded By, (t) = Taken By, (c) = Cosigned By      Initials Name Provider Type    Jonas Collins PT Physical Therapist                                 Physical Therapy Education       Title: PT OT  SLP Therapies (Done)       Topic: Physical Therapy (Done)       Point: Mobility training (Done)       Learning Progress Summary             Patient Acceptance, E,TB, VU by  at 8/12/2023 1415                         Point: Precautions (Done)       Learning Progress Summary             Patient Acceptance, E,TB, VU by EL at 8/12/2023 1415                                         User Key       Initials Effective Dates Name Provider Type [facility] 06/23/20 -  Jonas Braga, PT Physical Therapist PT                  PT Recommendation and Plan     Plan of Care Reviewed With: patient  Outcome Evaluation: Pt is a 61 YO M admitted with STEMI, with cardiac cath yesterday. Pt reports no pain at groin site this date. Pt states he lives at home with son and grandson, typically is independent with all ADLs, ambulation without AD and no recent falls. Pt this date demonstrates good mobility, performing bed mobility, transfers and ambulation without physical assistance. Pt appears near baseline mobility and does not require PT at this time. PT to sign off and will require new orders if status changes.     Time Calculation:   PT Evaluation Complexity  History, PT Evaluation Complexity: 1-2 personal factors and/or comorbidities  Examination of Body Systems (PT Eval Complexity): total of 3 or more elements  Clinical Presentation (PT Evaluation Complexity): evolving  Clinical Decision Making (PT Evaluation Complexity): moderate complexity  Overall Complexity (PT Evaluation Complexity): moderate complexity     PT Charges       Row Name 08/12/23 1415             Time Calculation    Start Time 1036  -EL      Stop Time 1050  -EL      Time Calculation (min) 14 min  -EL      PT Received On 08/12/23  -                User Key  (r) = Recorded By, (t) = Taken By, (c) = Cosigned By      Initials Name Provider Type    Jonas Collins, PT Physical Therapist                  Therapy Charges for Today       Code Description Service Date Service  Provider Modifiers Qty    60819647699 HC PT EVAL MOD COMPLEXITY 3 8/12/2023 Jonas Braga, PT GP 1            PT G-Codes  AM-PAC 6 Clicks Score (PT): 24  PT Discharge Summary  Anticipated Discharge Disposition (PT): home with assist    Jonas Braga PT  8/12/2023

## 2023-08-12 NOTE — CASE MANAGEMENT/SOCIAL WORK
Continued Stay Note  [facility]     Patient Name: Eber Garza  MRN: [id number]  Today's Date: 8/12/2023    Admit Date: 8/11/2023    Plan: Anticipate return home with family.   Discharge Plan       Row Name 08/12/23 0954       Plan    Plan Comments Spoke with Christen in [facility], patient was just set-up for Medicaid, therefore cost check for Brilinta can't be performed. Provided Brilinta coupon card for Medicaid patients to patient nurse to give patient.                      Expected Discharge Date and Time       Expected Discharge Date Expected Discharge Time    Aug 14, 2023               Sera Mendoza RN

## 2023-08-12 NOTE — PLAN OF CARE
Goal Outcome Evaluation:  Plan of Care Reviewed With: patient           Outcome Evaluation: Pt is a 63 YO M admitted with STEMI, with cardiac cath yesterday. Pt reports no pain at groin site this date. Pt states he lives at home with son and grandson, typically is independent with all ADLs, ambulation without AD and no recent falls. Pt this date demonstrates good mobility, performing bed mobility, transfers and ambulation without physical assistance. Pt appears near baseline mobility and does not require PT at this time. PT to sign off and will require new orders if status changes.      Anticipated Discharge Disposition (PT): home with assist

## 2023-08-12 NOTE — DISCHARGE SUMMARY
Date of Discharge:  8/12/2023    Discharge Diagnosis: STEMI, HFrEF, VT    Presenting Problem(s)  Active Hospital Problems    Diagnosis  POA    **STEMI (ST elevation myocardial infarction) [I21.3]  Yes    Acute on chronic systolic heart failure [I50.23]  Unknown    Acute on chronic HFrEF (heart failure with reduced ejection fraction) [I50.23]  Yes    STEMI involving left circumflex coronary artery [I21.21]  Unknown    Primary hypertension [I10]  Unknown    Mixed hyperlipidemia [E78.2]  Unknown    PAD (peripheral artery disease) [I73.9]  Unknown    Smoker [F17.200]  Unknown      Resolved Hospital Problems   No resolved problems to display.        Eber Garza is a 62 y.o. male with no previously known medical history other than hypertension for which he takes lisinopril and significant family history of heart disease in his father who presented to the outside hospital with complaints of chest pain and was diagnosed with lateral STEMI.  He also has ischemic changes in the anterior and inferior leads.  He was emergently transferred to [facility] where an urgent discussion was held with the patient.  We emergently proceeded with cardiac catheterization and primary PCI.     Hospital Course    STEMI/coronary artery disease  Patient presented with acute ST elevation MI involving the lateral leads with ischemic changes in anterior and inferior leads.  After emergent discussion about risk and benefit of the procedure he was taken to the Cath Lab.  Cardiac catheterization showed 100% occlusion of LAD and circumflex.  IVUS guided PCI and BRIT placement to left circumflex was successfully performed.  Unsuccessful attempt towards  PCI of LAD which receives collaterals from the RCA.  He will be transferred to CCU  He was started on dual antiplatelet therapy, high intensity statin and beta-blocker  Unable to add ACE inhibitor or Aldactone due to very low blood pressure  Cardiac rehab referral made at [place] office    Ischemic  cardiomyopathy  Echocardiogram shows EF of less than 20% with severely dilated left ventricle.  He also has moderate to severe mitral regurgitation.  Started oral diuretics  Continue beta-blocker  Short runs of nonsustained VT noted on telemetry.  LifeVest provided to the patient.  Plan to offer ICD 40 days post MI.     Hypertension  Typically takes lisinopril.  Unable to add ACE inhibitor or Aldactone due to hypotension     Hyperlipidemia  Start high intensity statin.  LDL 65, HDL 42, triglyceride 88 and total cholesterol 124  Goal LDL is less than 70     Peripheral arterial disease  During cardiac catheterization it was noted that he has complete occlusion of right common iliac.  Will need referral to vascular surgery regarding possible revascularization options as the patient does complain of claudications.  Continue dual antiplatelet therapy, high intensity statin.    Smoker  He has been smoking a pack a day for more than 40 years.  Smoking cessation counseling provided to the patient         Procedures Performed    Procedure(s):  Left Heart Cath  -------------------       Consults:   Consults       No orders found for last 30 day(s).            Pertinent Cardiac Test Results:     ECG:   ECG 12 Lead   Preliminary Result   HEART RATE= 82  bpm   RR Interval= 732  ms   AK Interval= 170  ms   P Horizontal Axis= 24  deg   P Front Axis= 26  deg   QRSD Interval= 118  ms   QT Interval= 418  ms   QRS Axis= 95  deg   T Wave Axis= -31  deg   - ABNORMAL ECG -   Sinus rhythm   Nonspecific intraventricular conduction delay   Nonspecific T abnormalities, diffuse leads   No previous ECG available for comparison   Electronically Signed By:    Date and Time of Study: 2023-08-12 03:33:35           Echocardiogram: Results for orders placed during the hospital encounter of 08/11/23    Adult Transthoracic Echo Complete W/ Cont if Necessary Per Protocol    Interpretation Summary    Left ventricular ejection fraction appears to be less  than 20%.    The left ventricular cavity is severely dilated.    Left ventricular diastolic dysfunction is noted.    The left atrial cavity is dilated.    Moderate to severe mitral valve regurgitation is present.      Stress Test:        Vital Signs  Temp:  [98.5 øF (36.9 øC)-99 øF (37.2 øC)] 98.7 øF (37.1 øC)  Heart Rate:  [] 78  Resp:  [17-21] 17  BP: ()/(59-84) 91/60    Physical Exam:  The patient is alert, oriented and in no distress.  Vital signs as noted above.  Head and neck revealed no carotid bruits or jugular venous distention.  No thyromegaly or lymph adenopathy is present  Lungs clear.  No wheezing.  Breath sounds are normal bilaterally.  Heart normal first and second heart sounds.  No precordial rub is present.  No gallop is present.  Abdomen soft and nontender.  No organomegaly is present.  Extremities with good peripheral pulses without any pedal edema.  Skin warm and dry.  Musculoskeletal system is grossly normal  CNS grossly normal    Condition on Discharge: Stable      Discharge Disposition:  Home or Self Care    Discharge Medications     Discharge Medications        New Medications        Instructions Start Date   acetaminophen 325 MG tablet  Commonly known as: TYLENOL   650 mg, Oral, Every 4 Hours PRN      Aspirin Low Dose 81 MG chewable tablet  Generic drug: aspirin   81 mg, Oral, Daily   Start Date: August 13, 2023     Brilinta 90 MG tablet tablet  Generic drug: ticagrelor   90 mg, Oral, 2 Times Daily      furosemide 40 MG tablet  Commonly known as: LASIX   40 mg, Oral, Daily   Start Date: August 13, 2023     Jardiance 10 MG tablet tablet  Generic drug: empagliflozin   10 mg, Oral, Daily      metoprolol succinate XL 25 MG 24 hr tablet  Commonly known as: TOPROL-XL   25 mg, Oral, Every 24 Hours Scheduled   Start Date: August 13, 2023     nitroglycerin 0.4 MG SL tablet  Commonly known as: NITROSTAT   0.4 mg, Sublingual, Every 5 Minutes PRN, Take no more than 3 doses in 15 minutes.       rosuvastatin 20 MG tablet  Commonly known as: CRESTOR   20 mg, Oral, Nightly             Stop These Medications      naproxen 500 MG tablet  Commonly known as: NAPROSYN              Discharge Diet: Cardiac diet    Activity at Discharge: Ad nina.    Follow-up Appointments  No future appointments.      Test Results Pending at Discharge       Francisco Moreno MD  08/12/23  17:08 EDT    Time: Discharge 60 min    Complicated patient with multivessel disease, peripheral arterial disease, ongoing smoking and new diagnosis of HFrEF.  Discussed natural history and possible therapeutic options for this patient.  I provided post STEMI care.  Discussed the rationale for dual antiplatelet therapy, high intensity statin and beta-blocker.  Explained the concept of GDMT.  Described LifeVest and ICD.  Discharge medication reconciliation was completed and follow-up appointments were made.

## 2023-08-12 NOTE — PLAN OF CARE
Goal Outcome Evaluation:  Plan of Care Reviewed With: patient        Progress: no change  Outcome Evaluation: Patient resting in bed, no resp distress noted. VSS and left and right groin cath site WNL. pt has no complaints at this time.

## 2023-08-13 ENCOUNTER — READMISSION MANAGEMENT (OUTPATIENT)
Dept: CALL CENTER | Facility: HOSPITAL | Age: 63
End: 2023-08-13
Payer: MEDICAID

## 2023-08-13 LAB
BH CV ECHO MEAS - ACS: 2.2 CM
BH CV ECHO MEAS - AO MAX PG: 4.9 MMHG
BH CV ECHO MEAS - AO MEAN PG: 3 MMHG
BH CV ECHO MEAS - AO V2 MAX: 111 CM/SEC
BH CV ECHO MEAS - AO V2 VTI: 19 CM
BH CV ECHO MEAS - AVA(I,D): 2.28 CM2
BH CV ECHO MEAS - EDV(CUBED): 227 ML
BH CV ECHO MEAS - EDV(MOD-SP2): 234 ML
BH CV ECHO MEAS - EDV(MOD-SP4): 261 ML
BH CV ECHO MEAS - EF(MOD-BP): 21.6 %
BH CV ECHO MEAS - EF(MOD-SP2): 26.1 %
BH CV ECHO MEAS - EF(MOD-SP4): 19.9 %
BH CV ECHO MEAS - ESV(CUBED): 140.6 ML
BH CV ECHO MEAS - ESV(MOD-SP2): 173 ML
BH CV ECHO MEAS - ESV(MOD-SP4): 209 ML
BH CV ECHO MEAS - FS: 14.8 %
BH CV ECHO MEAS - IVS/LVPW: 1 CM
BH CV ECHO MEAS - IVSD: 1.1 CM
BH CV ECHO MEAS - LA DIMENSION: 4.2 CM
BH CV ECHO MEAS - LAT PEAK E' VEL: 12.5 CM/SEC
BH CV ECHO MEAS - LV DIASTOLIC VOL/BSA (35-75): 142.8 CM2
BH CV ECHO MEAS - LV MASS(C)D: 287.5 GRAMS
BH CV ECHO MEAS - LV MAX PG: 2.5 MMHG
BH CV ECHO MEAS - LV MEAN PG: 1 MMHG
BH CV ECHO MEAS - LV SYSTOLIC VOL/BSA (12-30): 114.3 CM2
BH CV ECHO MEAS - LV V1 MAX: 79.8 CM/SEC
BH CV ECHO MEAS - LV V1 VTI: 12.5 CM
BH CV ECHO MEAS - LVIDD: 6.1 CM
BH CV ECHO MEAS - LVIDS: 5.2 CM
BH CV ECHO MEAS - LVOT AREA: 3.5 CM2
BH CV ECHO MEAS - LVOT DIAM: 2.1 CM
BH CV ECHO MEAS - LVPWD: 1.1 CM
BH CV ECHO MEAS - MED PEAK E' VEL: 9.8 CM/SEC
BH CV ECHO MEAS - MR MAX PG: 92.2 MMHG
BH CV ECHO MEAS - MR MAX VEL: 480 CM/SEC
BH CV ECHO MEAS - MR MEAN PG: 48 MMHG
BH CV ECHO MEAS - MR MEAN VEL: 316 CM/SEC
BH CV ECHO MEAS - MR VTI: 142 CM
BH CV ECHO MEAS - MV DEC SLOPE: 835 CM/SEC2
BH CV ECHO MEAS - MV DEC TIME: 0.14 MSEC
BH CV ECHO MEAS - MV E MAX VEL: 118 CM/SEC
BH CV ECHO MEAS - MV MAX PG: 4.9 MMHG
BH CV ECHO MEAS - MV MEAN PG: 3 MMHG
BH CV ECHO MEAS - MV V2 VTI: 16.4 CM
BH CV ECHO MEAS - MVA(VTI): 2.6 CM2
BH CV ECHO MEAS - PA V2 MAX: 83.4 CM/SEC
BH CV ECHO MEAS - QP/QS: 0.48
BH CV ECHO MEAS - RF(MV,LVOT)(1DIAM): 0.73 CM
BH CV ECHO MEAS - RV MAX PG: 2.8 MMHG
BH CV ECHO MEAS - RV V1 MAX: 83.8 CM/SEC
BH CV ECHO MEAS - RV V1 VTI: 13.5 CM
BH CV ECHO MEAS - RVDD: 1.4 CM
BH CV ECHO MEAS - RVOT DIAM: 1.4 CM
BH CV ECHO MEAS - SI(MOD-SP2): 33.4 ML/M2
BH CV ECHO MEAS - SI(MOD-SP4): 28.4 ML/M2
BH CV ECHO MEAS - SV(LVOT): 43.3 ML
BH CV ECHO MEAS - SV(MOD-SP2): 61 ML
BH CV ECHO MEAS - SV(MOD-SP4): 52 ML
BH CV ECHO MEAS - SV(RVOT): 20.8 ML
BH CV ECHO MEAS - TR MAX PG: 7.3 MMHG
BH CV ECHO MEAS - TR MAX VEL: 135 CM/SEC
BH CV ECHO MEASUREMENTS AVERAGE E/E' RATIO: 10.58
SINUS: 3.5 CM

## 2023-08-13 NOTE — OUTREACH NOTE
Prep Survey      Flowsheet Row Responses   [facility] facility patient discharged from? [facility]   Is LACE score < 7 ? Yes   Eligibility Readm Mgmt   Discharge diagnosis STEMI   Does the patient have one of the following disease processes/diagnoses(primary or secondary)? Acute MI (STEMI,NSTEMI)   Does the patient have Home health ordered? No   Is there a DME ordered? No   Prep survey completed? Yes            Sonal VELÁZQUEZ - Registered Nurse

## 2023-08-16 ENCOUNTER — READMISSION MANAGEMENT (OUTPATIENT)
Dept: CALL CENTER | Facility: HOSPITAL | Age: 63
End: 2023-08-16
Payer: MEDICAID

## 2023-08-16 NOTE — OUTREACH NOTE
AMI Week 1 Survey      Flowsheet Row Responses   [facility] facility patient discharged from? [facility]   Does the patient have one of the following disease processes/diagnoses(primary or secondary)? Acute MI (STEMI,NSTEMI)   Week 1 attempt successful? No   Unsuccessful attempts Attempt 1            Karla VICK - Registered Nurse

## 2023-08-22 ENCOUNTER — READMISSION MANAGEMENT (OUTPATIENT)
Dept: CALL CENTER | Facility: HOSPITAL | Age: 63
End: 2023-08-22
Payer: MEDICAID

## 2023-08-22 NOTE — OUTREACH NOTE
AMI Week 1 Survey      Flowsheet Row Responses   [facility] patient discharged from? [facility]   Does the patient have one of the following disease processes/diagnoses(primary or secondary)? Acute MI (STEMI,NSTEMI)   Call start time 1407   Call end time 1413   Discharge diagnosis STEMI   Meds reviewed with patient/caregiver? Yes   Is the patient having any side effects they believe may be caused by any medication additions or changes? No   Does the patient have all prescriptions related to this admission filled (includes statins,anticoagulants,HTN meds,anti-arrhythmia meds) Yes   Is the patient taking all medications as directed (includes completed medication regime)? Yes   Comments regarding appointments Cardiology appt 9/1/23   Does the patient have a primary care provider?  Yes   Does the patient have an appointment with their PCP,cardiologist,or clinic within 7 days of discharge? No   What is preventing the patient from scheduling follow up appointments within 7 days of discharge? Haven't had time   Nursing Interventions Educated patient on importance of making appointment, Advised patient to make appointment   Has the patient kept scheduled appointments due by today? N/A   Has home health visited the patient within 72 hours of discharge? N/A   Psychosocial issues? No   Did the patient receive a copy of their discharge instructions? Yes   Nursing interventions Reviewed instructions with patient   What is the patient's perception of their health status since discharge? Improving   Nursing interventions Nurse provided patient education   Is the patient/caregiver able to teach back signs and symptoms of when to call for help immediately: Sudden chest discomfort, Shortness of breath at any time, Nausea or vomiting, Irregular or rapid heart rate, Sudden discomfort in arms, back, neck or jaw, Sudden sweating or clammy skin, Dizziness or lightheadedness   Nursing interventions Nurse provided patient education   Is  the pateint /caregiver able to teach back the importance of cardiac rehab? Yes   Nursing interventions Provided education on importance of cardiac rehab   Is the patient/caregiver able to teach back lifestyle changes to help prevent MIs Quit smoking, Heart healthy diet, Managing diabetes, Maintaining a healthy weight   Is the patient/caregiver able to teach back ways to prevent a second heart attack: Take medications, Follow up with MD, Participate in Cardiac Rehab   If the patient is a current smoker, are they able to teach back resources for cessation? Smoking cessation medications   Is the patient/caregiver able to teach back the hierarchy of who to call/visit for symptoms/problems? PCP, Specialist, Home health nurse, Urgent Care, ED, 911 Yes   Is the patient interested in additional calls from an ambulatory ? No   Would this patient benefit from a Referral to [facility] Social Work? No   Call end time 1413            Mel CORNEJO - Registered Nurse

## 2023-08-30 NOTE — PROGRESS NOTES
Encounter Date:09/01/2023        Patient ID: Eber Garza is a 62 y.o. male.      Chief Complaint:      History of Present Illness    62-year-old man with hypertension, hyperlipidemia, peripheral arterial disease, smoking presented with STEMI and was treated with primary PCI in August 2023.  Today he comes in with no complaints of chest pain or shortness of breath.  He does complain about discomfort of wearing a LifeVest.  He would like refills of his medications.  He has not started cardiac rehab.  He continues to smoke a pack a day.        The following portions of the patient's history were reviewed and updated as appropriate: allergies, current medications, past family history, past medical history, past social history, past surgical history, and problem list.    Review of Systems   Constitutional: Negative for malaise/fatigue.   Cardiovascular:  Negative for chest pain, dyspnea on exertion, leg swelling and palpitations.   Respiratory:  Negative for cough and shortness of breath.    Gastrointestinal:  Negative for abdominal pain, nausea and vomiting.   Neurological:  Negative for dizziness, focal weakness, headaches, light-headedness and numbness.   All other systems reviewed and are negative.      Current Outpatient Medications:     acetaminophen (TYLENOL) 325 MG tablet, Take 2 tablets by mouth Every 4 (Four) Hours As Needed for Mild Pain or Fever (temperature greater than 101F)., Disp: 90 tablet, Rfl: 0    aspirin 81 MG chewable tablet, Chew 1 tablet Daily., Disp: 90 tablet, Rfl: 3    empagliflozin (JARDIANCE) 10 MG tablet tablet, Take 1 tablet by mouth Daily., Disp: 90 tablet, Rfl: 3    furosemide (LASIX) 40 MG tablet, Take 1 tablet by mouth Daily., Disp: 90 tablet, Rfl: 3    lisinopril (PRINIVIL,ZESTRIL) 40 MG tablet, Take 1 tablet by mouth., Disp: , Rfl:     metoprolol succinate XL (TOPROL-XL) 25 MG 24 hr tablet, Take 1 tablet by mouth Daily., Disp: 90 tablet, Rfl: 3    nitroglycerin (NITROSTAT) 0.4 MG SL  "tablet, Place 1 tablet under the tongue Every 5 (Five) Minutes As Needed for Chest Pain (Systolic BP Greater Than 100). Take no more than 3 doses in 15 minutes., Disp: 100 tablet, Rfl: 12    rosuvastatin (CRESTOR) 20 MG tablet, Take 1 tablet by mouth Every Night., Disp: 90 tablet, Rfl: 3    ticagrelor (BRILINTA) 90 MG tablet tablet, Take 1 tablet by mouth 2 (Two) Times a Day., Disp: 180 tablet, Rfl: 3  No current facility-administered medications for this visit.    Facility-Administered Medications Ordered in Other Visits:     Sulfur Hexafluoride Microsph (LUMASON) 60.7-25 MG IV reconstituted suspension reconstituted suspension 2 mL, 2 mL, Intravenous, Once in imaging, Francisco Moreno MD    Current outpatient and discharge medications have been reconciled for the patient.  Reviewed by: Francisco Moreno MD       No Known Allergies    Family History   Problem Relation Age of Onset    Stroke Mother     Heart attack Father        Past Surgical History:   Procedure Laterality Date    BACK SURGERY      CARDIAC CATHETERIZATION N/A 8/11/2023    Procedure: Left Heart Cath;  Surgeon: Francisco Moreno MD;  Location: [facility] CATH INVASIVE LOCATION;  Service: Cardiovascular;  Laterality: N/A;    FRACTURE SURGERY         History reviewed. No pertinent past medical history.    Family History   Problem Relation Age of Onset    Stroke Mother     Heart attack Father        Social History     Socioeconomic History    Marital status: Significant Other   Tobacco Use    Smoking status: Every Day     Packs/day: 1.00     Years: 50.00     Pack years: 50.00     Types: Cigarettes    Smokeless tobacco: Former   Vaping Use    Vaping Use: Never used   Substance and Sexual Activity    Alcohol use: Not Currently    Drug use: Never               Objective:       Physical Exam    Ht 175.3 cm (69\")   Wt 66.7 kg (147 lb)   SpO2 97%   BMI 21.71 kg/mý   The patient is alert, oriented and in no distress.    Vital signs as noted above.    Head and neck revealed " no carotid bruits or jugular venous distension.  No thyromegaly or lymphadenopathy is present.    Lungs clear.  No wheezing.  Breath sounds are normal bilaterally.    Heart normal first and second heart sounds.  No murmur..  No pericardial rub is present.  No gallop is present.    Abdomen soft and nontender.  No organomegaly is present.    Extremities revealed good peripheral pulses without any pedal edema.    Skin warm and dry.    Musculoskeletal system is grossly normal.    CNS grossly normal.           Diagnosis Plan   1. STEMI involving left circumflex coronary artery        2. Primary hypertension        3. Mixed hyperlipidemia        4. PAD (peripheral artery disease)        5. Smoker        6. Acute on chronic systolic heart failure        7. Acute on chronic HFrEF (heart failure with reduced ejection fraction)        LAB RESULTS (LAST 7 DAYS)    CBC        BMP        CMP         BNP        TROPONIN        CoAg        Creatinine Clearance  Estimated Creatinine Clearance: 65.2 mL/min (by C-G formula based on SCr of 1.08 mg/dL).    ABG        Radiology  No radiology results for the last day    EKG  Procedures    Stress test      Echocardiogram  Results for orders placed during the hospital encounter of 08/11/23    Adult Transthoracic Echo Complete W/ Cont if Necessary Per Protocol    Interpretation Summary    Left ventricular ejection fraction appears to be less than 20%.    The left ventricular cavity is severely dilated.    Left ventricular diastolic dysfunction is noted.    The left atrial cavity is dilated.    Moderate to severe mitral valve regurgitation is present.      Cardiac catheterization  Results for orders placed during the hospital encounter of 08/11/23    Cardiac Catheterization/Vascular Study    Narrative  OPERATORS  Francisco Moreno M.D. (Attending Cardiologist)      PROCEDURES PERFORMED  Ultrasound guided Vascular access  Left Heart Catheterization  Coronary Angiogram 40418  PCI for STEMI, left  circumflex 00864  Intravascular ultrasound of left circumflex 18328  PCI for , LAD 98850  Moderate sedation 60 minutes    INDICATIONS FOR PROCEDURE  62 years old man with no previous medical contact presented with chest pain at an outside hospital.  He was noted to have significant ST changes and was diagnosed with lateral MI.  He was urgently transferred to [facility] for further interventions.  Reasons for delay in perfusion were a) time spent in diagnosis and transfer of the patient from outside hospital to [facility]; b) occlusion of right common iliac artery requiring repreparation and access of left common femoral artery.    PROCEDURE IN DETAIL  Informed consent was obtained from the patient after explaining the risks, benefits, and alternative options of the procedure. After obtaining informed consent, the patient was brought to the cath lab and was prepped in a sterile fashion. Lidocaine 2% was used for local anesthesia into the right femoral access site. The right femoral artery was accessed with a micropuncture needle via modified Seldinger technique under ultrasound guidance.  Angiogram of right common femoral artery showed complete occlusion of the right iliac.  At this point I obtained access in the left common femoral arterial site.  A long 6F destination sheath was inserted successfully. Afterwards, 6F JR4 and JL4 diagnostic catheters were advanced over a wire into the ascending aorta and were used to engage the ostia of the left main and RCA respectively. JR4 used to cross the AV and obtain LV pressures and gradient across the AV measured via pullback technique. Images of the right and left coronary systems were obtained.    HEMODYNAMICS    LV: 117/16, 25 mmHg  AO: 119/66, 88 mmHg  Gradient    FINDINGS  Coronary Angiogram    Right dominant circulation    Left main: Left main is a large caliber vessel which gives rise to the Left Anterior Descending and the Left circumflex.  Left main is  angiographically free from any significant disease    Left Anterior Descending Artery: LAD is a medium caliber vessel which gives rise to several septal perforators and several diagonal branches.  LAD is totally occluded, 100% occluded in the midsegment.  NORY 0 flow    Left Circumflex: Left circumflex artery gives rise to obtuse marginals.  Proximal left circumflex 100% occluded.  NORY 0 flow    Right Coronary Artery: The RCA is a large caliber vessel gives rise to PDA and PLV.  Mid RCA has 40 to 50% stenosis.  Distal RCA has 50 to 60% stenosis.  Distal RCA gives collaterals to LAD    Percutaneous coronary intervention  100 units/kg of heparin was administered and ACT of more than 250 was documented.  We had significant difficulty navigating the left common femoral arterial access and therefore long 6 Zambian destination sheath 45 cm was used.  A 6 Zambian XB 3.5 guide catheter was used to engage the ostium of left main coronary artery.  He was noted to have 2 occluded vessels LAD and left circumflex.  Based on the ECG left circumflex was thought to be acute and therefore first attempt was made to open the left circumflex artery.  0.014 run-through wire was advanced into the left circumflex and OM vessel.  I then predilated the vessel with 2 x 12 mm mini trek balloon at 12 chapincito.  I then advanced intravascular ultrasound catheter into the mid left circumflex and performed a slow manual pullback.  This showed mid left circumflex to be 2.5 mm in dimension and proximal left circumflex to be 3.5 mm in dimension.  IVUS also confirmed thrombotic occlusion of proximal left circumflex artery.  I then placed a 3 x 23 mm Xience oly point stent.  This was postdilated with 3.5 x 12 mm noncompliant balloon.  Final angiography showed NORY-3 flow and 0% stenosis in left circumflex.    I then diverted my attention to LAD.  0.014 run-through wire was attempted however it would not cross the LAD occlusion which behaved like a .  I  then used a 0.014 Fielder XT wire to carefully navigate the lesion.  I then used a 2 x 12 mm noncompliant balloon to perform serial inflations of mid LAD.  I could not get flow back into this vessel.  Angiogram from the right coronary artery showed collaterals to the LAD.  At this point I stopped further attempts for revascularization as LAD is a .  Final angiography continued to show 100% occlusion of LAD and NORY 0 flow.    All the catheters were exchanged over a wire and subsequently removed. Angiogram of the femoral access site was obtained and did not show complications. The patient tolerated the procedure well without any complications. The pictures were reviewed at the end of the procedure. A 6 Lithuanian Angio-Seal closure device was applied.    ESTIMATED BLOOD LOSS:  10 ml    COMPLICATIONS:  None    PROCEDURE DATA:  Contrast Used: 100 cc  Sedation Time: 60 minutes    IMPRESSIONS  100% occlusion of LAD and left circumflex.  IVUS guided PCI with BRIT placement of culprit vessel, Lcx  Unsuccessful attempt for PCI of LAD     RECOMMENDATIONS  -Start dual antiplatelet therapy  -Start high intensity statin  -Obtain an echocardiogram  -Recommend cardiac rehab    Electronically signed by Francisco Moreno MD, 08/11/23, 7:02 AM EDT.          Assessment and Plan       Diagnoses and all orders for this visit:    1. STEMI involving left circumflex coronary artery (Primary)    2. Primary hypertension    3. Mixed hyperlipidemia    4. PAD (peripheral artery disease)    5. Smoker    6. Acute on chronic systolic heart failure    7. Acute on chronic HFrEF (heart failure with reduced ejection fraction)         STEMI/coronary artery disease  Patient presented with acute ST elevation MI involving the lateral leads with ischemic changes in anterior and inferior leads.  After emergent discussion about risk and benefit of the procedure he was taken to the Cath Lab.  Cardiac catheterization showed 100% occlusion of LAD and  circumflex.  IVUS guided PCI and BRIT placement to left circumflex was successfully performed.  Unsuccessful attempt towards  PCI of LAD which receives collaterals from the RCA.  Continue dual antiplatelet therapy, high intensity statin and beta-blocker  Started low-dose ACE inhibitor.  Cardiac rehab referral made at [place] office.     Ischemic cardiomyopathy  Echocardiogram shows EF of less than 20% with severely dilated left ventricle.  He also has moderate to severe mitral regurgitation.  Started oral diuretics  Continue beta-blocker and ACE inhibitor.  We will continue to uptitrate GDMT as tolerated.  Currently limited by low blood pressure.  Short runs of nonsustained VT noted on telemetry.  LifeVest provided to the patient.  Repeat echocardiogram in November  If EF does not improve he will need ICD for primary prevention.     Hypertension  Typically takes lisinopril.  Unable to add ACE inhibitor or Aldactone due to hypotension     Hyperlipidemia  Start high intensity statin.  LDL 65, HDL 42, triglyceride 88 and total cholesterol 124  Goal LDL is less than 70     Peripheral arterial disease  During cardiac catheterization it was noted that he has complete occlusion of right common iliac.  Will need referral to vascular surgery regarding possible revascularization options as the patient does complain of claudications.  Continue dual antiplatelet therapy, high intensity statin.     Smoker  He has been smoking a pack a day for more than 40 years.  Smoking cessation counseling provided to the patient.  He continues to smoke a pack a day.

## 2023-09-01 ENCOUNTER — OFFICE VISIT (OUTPATIENT)
Dept: CARDIOLOGY | Facility: CLINIC | Age: 63
End: 2023-09-01
Payer: MEDICAID

## 2023-09-01 VITALS
OXYGEN SATURATION: 97 % | SYSTOLIC BLOOD PRESSURE: 126 MMHG | BODY MASS INDEX: 21.77 KG/M2 | WEIGHT: 147 LBS | HEIGHT: 69 IN | HEART RATE: 73 BPM | DIASTOLIC BLOOD PRESSURE: 62 MMHG

## 2023-09-01 DIAGNOSIS — I50.23 ACUTE ON CHRONIC SYSTOLIC HEART FAILURE: ICD-10-CM

## 2023-09-01 DIAGNOSIS — I10 PRIMARY HYPERTENSION: ICD-10-CM

## 2023-09-01 DIAGNOSIS — F17.200 SMOKER: ICD-10-CM

## 2023-09-01 DIAGNOSIS — I50.23 ACUTE ON CHRONIC HFREF (HEART FAILURE WITH REDUCED EJECTION FRACTION): ICD-10-CM

## 2023-09-01 DIAGNOSIS — I73.9 PAD (PERIPHERAL ARTERY DISEASE): ICD-10-CM

## 2023-09-01 DIAGNOSIS — E78.2 MIXED HYPERLIPIDEMIA: ICD-10-CM

## 2023-09-01 DIAGNOSIS — I21.21 STEMI INVOLVING LEFT CIRCUMFLEX CORONARY ARTERY: Primary | ICD-10-CM

## 2023-09-01 RX ORDER — VARENICLINE TARTRATE 1 MG/1
1 TABLET, FILM COATED ORAL 2 TIMES DAILY
Qty: 126 TABLET | Refills: 0 | Status: SHIPPED | OUTPATIENT
Start: 2023-09-29

## 2023-09-01 RX ORDER — LISINOPRIL 40 MG/1
40 TABLET ORAL
COMMUNITY
Start: 2023-06-29

## 2023-09-29 ENCOUNTER — TELEPHONE (OUTPATIENT)
Dept: CARDIOLOGY | Facility: CLINIC | Age: 63
End: 2023-09-29
Payer: MEDICAID

## 2023-09-29 NOTE — TELEPHONE ENCOUNTER
MICHELLE for Dr Moreno, he referred pt to cardiac rehab on 9/1/23. Pt was scheduled for 9/27/23 and was a no show per Diandra at [facility].

## 2023-10-10 RX ORDER — LISINOPRIL 40 MG/1
40 TABLET ORAL DAILY
Qty: 90 TABLET | Refills: 3 | Status: SHIPPED | OUTPATIENT
Start: 2023-10-10

## 2023-12-10 NOTE — PROGRESS NOTES
Encounter Date:12/13/2023        Patient ID: Eber Garza is a 62 y.o. male.      Chief Complaint:      History of Present Illness  62-year-old man with hypertension, hyperlipidemia, peripheral arterial disease, smoking presented with STEMI and was treated with primary PCI in August 2023.  Today he comes in with no complaints of chest pain or shortness of breath.  He does complain about discomfort of wearing a LifeVest.  He would like refills of his medications.  He has not started cardiac rehab.  He continues to smoke a pack a day.  He is requesting for Chantix.  He denies any chest pain or shortness of breath.       The following portions of the patient's history were reviewed and updated as appropriate: allergies, current medications, past family history, past medical history, past social history, past surgical history, and problem list.    Review of Systems   Constitutional: Negative for malaise/fatigue.   Cardiovascular:  Negative for chest pain, dyspnea on exertion, leg swelling and palpitations.   Respiratory:  Negative for cough and shortness of breath.    Gastrointestinal:  Negative for abdominal pain, nausea and vomiting.   Neurological:  Negative for dizziness, focal weakness, headaches, light-headedness and numbness.   All other systems reviewed and are negative.        Current Outpatient Medications:     acetaminophen (TYLENOL) 325 MG tablet, Take 2 tablets by mouth Every 4 (Four) Hours As Needed for Mild Pain or Fever (temperature greater than 101F)., Disp: 90 tablet, Rfl: 0    aspirin 81 MG chewable tablet, Chew 1 tablet Daily., Disp: 90 tablet, Rfl: 3    empagliflozin (JARDIANCE) 10 MG tablet tablet, Take 1 tablet by mouth Daily., Disp: 90 tablet, Rfl: 3    furosemide (LASIX) 40 MG tablet, Take 1 tablet by mouth Daily., Disp: 90 tablet, Rfl: 3    lisinopril (PRINIVIL,ZESTRIL) 40 MG tablet, Take 1 tablet by mouth Daily., Disp: 90 tablet, Rfl: 3    metoprolol succinate XL (TOPROL-XL) 25 MG 24 hr  tablet, Take 1 tablet by mouth Daily., Disp: 90 tablet, Rfl: 3    nitroglycerin (NITROSTAT) 0.4 MG SL tablet, Place 1 tablet under the tongue Every 5 (Five) Minutes As Needed for Chest Pain (Systolic BP Greater Than 100). Take no more than 3 doses in 15 minutes., Disp: 100 tablet, Rfl: 12    rosuvastatin (CRESTOR) 20 MG tablet, Take 1 tablet by mouth Every Night., Disp: 90 tablet, Rfl: 3    ticagrelor (BRILINTA) 90 MG tablet tablet, Take 1 tablet by mouth 2 (Two) Times a Day., Disp: 180 tablet, Rfl: 3  No current facility-administered medications for this visit.    Facility-Administered Medications Ordered in Other Visits:     Sulfur Hexafluoride Microsph (LUMASON) 60.7-25 MG IV reconstituted suspension reconstituted suspension 2 mL, 2 mL, Intravenous, Once in imaging, Francisco Moreno MD    Current outpatient and discharge medications have been reconciled for the patient.  Reviewed by: Francisco Moreno MD       No Known Allergies    Family History   Problem Relation Age of Onset    Stroke Mother     Heart attack Father        Past Surgical History:   Procedure Laterality Date    BACK SURGERY      CARDIAC CATHETERIZATION N/A 8/11/2023    Procedure: Left Heart Cath;  Surgeon: Francisco Moreno MD;  Location: [facility] CATH INVASIVE LOCATION;  Service: Cardiovascular;  Laterality: N/A;    FRACTURE SURGERY         Past Medical History:   Diagnosis Date    Hypertension        Family History   Problem Relation Age of Onset    Stroke Mother     Heart attack Father        Social History     Socioeconomic History    Marital status: Significant Other   Tobacco Use    Smoking status: Every Day     Packs/day: 1.00     Years: 50.00     Additional pack years: 0.00     Total pack years: 50.00     Types: Cigarettes     Passive exposure: Current    Smokeless tobacco: Former   Vaping Use    Vaping Use: Never used   Substance and Sexual Activity    Alcohol use: Not Currently    Drug use: Never    Sexual activity: Defer               Objective:  "      Physical Exam    /55 (BP Location: Left arm, Patient Position: Sitting, Cuff Size: Adult)   Pulse 79   Ht 175.3 cm (69\")   Wt 70.3 kg (155 lb)   SpO2 94%   BMI 22.89 kg/m²   The patient is alert, oriented and in no distress.    Vital signs as noted above.    Head and neck revealed no carotid bruits or jugular venous distension.  No thyromegaly or lymphadenopathy is present.    Lungs clear.  No wheezing.  Breath sounds are normal bilaterally.    Heart normal first and second heart sounds.  No murmur..  No pericardial rub is present.  No gallop is present.    Abdomen soft and nontender.  No organomegaly is present.    Extremities revealed good peripheral pulses without any pedal edema.    Skin warm and dry.    Musculoskeletal system is grossly normal.    CNS grossly normal.           Diagnosis Plan   1. STEMI involving left circumflex coronary artery        2. Primary hypertension        3. Mixed hyperlipidemia        4. PAD (peripheral artery disease)        5. Smoker        6. Acute on chronic systolic heart failure        7. Acute on chronic HFrEF (heart failure with reduced ejection fraction)        LAB RESULTS (LAST 7 DAYS)    CBC        BMP        CMP         BNP        TROPONIN        CoAg        Creatinine Clearance  CrCl cannot be calculated (Patient's most recent lab result is older than the maximum 30 days allowed.).    ABG        Radiology  No radiology results for the last day    EKG  Procedures    Stress test      Echocardiogram  Results for orders placed during the hospital encounter of 08/11/23    Adult Transthoracic Echo Complete W/ Cont if Necessary Per Protocol    Interpretation Summary    Left ventricular ejection fraction appears to be less than 20%.    The left ventricular cavity is severely dilated.    Left ventricular diastolic dysfunction is noted.    The left atrial cavity is dilated.    Moderate to severe mitral valve regurgitation is present.      Cardiac " catheterization  Results for orders placed during the hospital encounter of 08/11/23    Cardiac Catheterization/Vascular Study    Narrative  OPERATORS  Francisco Moreno M.D. (Attending Cardiologist)      PROCEDURES PERFORMED  Ultrasound guided Vascular access  Left Heart Catheterization  Coronary Angiogram 82541  PCI for STEMI, left circumflex 89640  Intravascular ultrasound of left circumflex 82285  PCI for , LAD 55173  Moderate sedation 60 minutes    INDICATIONS FOR PROCEDURE  62 years old man with no previous medical contact presented with chest pain at an outside hospital.  He was noted to have significant ST changes and was diagnosed with lateral MI.  He was urgently transferred to [facility] for further interventions.  Reasons for delay in perfusion were a) time spent in diagnosis and transfer of the patient from outside hospital to [facility]; b) occlusion of right common iliac artery requiring repreparation and access of left common femoral artery.    PROCEDURE IN DETAIL  Informed consent was obtained from the patient after explaining the risks, benefits, and alternative options of the procedure. After obtaining informed consent, the patient was brought to the cath lab and was prepped in a sterile fashion. Lidocaine 2% was used for local anesthesia into the right femoral access site. The right femoral artery was accessed with a micropuncture needle via modified Seldinger technique under ultrasound guidance.  Angiogram of right common femoral artery showed complete occlusion of the right iliac.  At this point I obtained access in the left common femoral arterial site.  A long 6F destination sheath was inserted successfully. Afterwards, 6F JR4 and JL4 diagnostic catheters were advanced over a wire into the ascending aorta and were used to engage the ostia of the left main and RCA respectively. JR4 used to cross the AV and obtain LV pressures and gradient across the AV measured via pullback technique. Images of  the right and left coronary systems were obtained.    HEMODYNAMICS    LV: 117/16, 25 mmHg  AO: 119/66, 88 mmHg  Gradient    FINDINGS  Coronary Angiogram    Right dominant circulation    Left main: Left main is a large caliber vessel which gives rise to the Left Anterior Descending and the Left circumflex.  Left main is angiographically free from any significant disease    Left Anterior Descending Artery: LAD is a medium caliber vessel which gives rise to several septal perforators and several diagonal branches.  LAD is totally occluded, 100% occluded in the midsegment.  NORY 0 flow    Left Circumflex: Left circumflex artery gives rise to obtuse marginals.  Proximal left circumflex 100% occluded.  NORY 0 flow    Right Coronary Artery: The RCA is a large caliber vessel gives rise to PDA and PLV.  Mid RCA has 40 to 50% stenosis.  Distal RCA has 50 to 60% stenosis.  Distal RCA gives collaterals to LAD    Percutaneous coronary intervention  100 units/kg of heparin was administered and ACT of more than 250 was documented.  We had significant difficulty navigating the left common femoral arterial access and therefore long 6 [place] destination sheath 45 cm was used.  A 6 [place]ian XB 3.5 guide catheter was used to engage the ostium of left main coronary artery.  He was noted to have 2 occluded vessels LAD and left circumflex.  Based on the ECG left circumflex was thought to be acute and therefore first attempt was made to open the left circumflex artery.  0.014 run-through wire was advanced into the left circumflex and OM vessel.  I then predilated the vessel with 2 x 12 mm mini trek balloon at 12 chapincito.  I then advanced intravascular ultrasound catheter into the mid left circumflex and performed a slow manual pullback.  This showed mid left circumflex to be 2.5 mm in dimension and proximal left circumflex to be 3.5 mm in dimension.  IVUS also confirmed thrombotic occlusion of proximal left circumflex artery.  I then placed a 3  x 23 mm Xience oly point stent.  This was postdilated with 3.5 x 12 mm noncompliant balloon.  Final angiography showed NORY-3 flow and 0% stenosis in left circumflex.    I then diverted my attention to LAD.  0.014 run-through wire was attempted however it would not cross the LAD occlusion which behaved like a .  I then used a 0.014 Fielder XT wire to carefully navigate the lesion.  I then used a 2 x 12 mm noncompliant balloon to perform serial inflations of mid LAD.  I could not get flow back into this vessel.  Angiogram from the right coronary artery showed collaterals to the LAD.  At this point I stopped further attempts for revascularization as LAD is a .  Final angiography continued to show 100% occlusion of LAD and NORY 0 flow.    All the catheters were exchanged over a wire and subsequently removed. Angiogram of the femoral access site was obtained and did not show complications. The patient tolerated the procedure well without any complications. The pictures were reviewed at the end of the procedure. A 6 [place] Angio-Seal closure device was applied.    ESTIMATED BLOOD LOSS:  10 ml    COMPLICATIONS:  None    PROCEDURE DATA:  Contrast Used: 100 cc  Sedation Time: 60 minutes    IMPRESSIONS  100% occlusion of LAD and left circumflex.  IVUS guided PCI with BRIT placement of culprit vessel, Lcx  Unsuccessful attempt for PCI of LAD     RECOMMENDATIONS  -Start dual antiplatelet therapy  -Start high intensity statin  -Obtain an echocardiogram  -Recommend cardiac rehab    Electronically signed by Francisco Moreno MD, 08/11/23, 7:02 AM EDT.          Assessment and Plan       Diagnoses and all orders for this visit:    1. STEMI involving left circumflex coronary artery (Primary)    2. Primary hypertension    3. Mixed hyperlipidemia    4. PAD (peripheral artery disease)    5. Smoker    6. Acute on chronic systolic heart failure    7. Acute on chronic HFrEF (heart failure with reduced ejection fraction)          STEMI/coronary artery disease  Patient presented with acute ST elevation MI involving the lateral leads with ischemic changes in anterior and inferior leads.  After emergent discussion about risk and benefit of the procedure he was taken to the Cath Lab.  Cardiac catheterization showed 100% occlusion of LAD and circumflex.  IVUS guided PCI and BRIT placement to left circumflex was successfully performed.  Unsuccessful attempt towards  PCI of LAD which receives collaterals from the RCA.  Continue dual antiplatelet therapy, high intensity statin and beta-blocker  Continue ACE inhibitor  Completed cardiac rehab     Ischemic cardiomyopathy  Echocardiogram shows EF of less than 20% with severely dilated left ventricle.  He also has moderate to severe mitral regurgitation.  Continue diuretics, ACE inhibitor and beta-blocker  Continue Jardiance  We will continue to uptitrate GDMT as tolerated.  Currently limited by low blood pressure.  Short runs of nonsustained VT noted on telemetry.  Repeat echocardiogram in December 2023 shows improvement in LV function with EF of 35%  LifeVest will be discontinued today.  Repeat echocardiogram 6 monthly to follow-up on EF and mitral valve.   Currently euvolemic and asymptomatic.    Mitral regurgitation  Echocardiogram shows moderate to severe mitral regurgitation  Secondary MR  Currently asymptomatic  Follow-up on serial echocardiograms  Discussed possibility of MitraClip if he became symptomatic.     Hypertension  Tolerating ACE inhibitor and beta-blocker.  Unable to uptitrate due to hypotension     Hyperlipidemia  Start high intensity statin.  LDL 65, HDL 42, triglyceride 88 and total cholesterol 124  Goal LDL is less than 70     Peripheral arterial disease  During cardiac catheterization it was noted that he has complete occlusion of right common iliac.  Will need referral to vascular surgery regarding possible revascularization options as the patient does complain of  claudications.  Continue dual antiplatelet therapy, high intensity statin.     Smoker  He has been smoking a pack a day for more than 40 years.  Smoking cessation counseling provided to the patient.  He continues to smoke a pack a day.  Provided prescription for Chantix

## 2023-12-13 ENCOUNTER — OUTSIDE FACILITY SERVICE (OUTPATIENT)
Dept: CARDIOLOGY | Facility: CLINIC | Age: 63
End: 2023-12-13
Payer: MEDICARE

## 2023-12-13 ENCOUNTER — OFFICE VISIT (OUTPATIENT)
Dept: CARDIOLOGY | Facility: CLINIC | Age: 63
End: 2023-12-13
Payer: MEDICARE

## 2023-12-13 VITALS
OXYGEN SATURATION: 94 % | DIASTOLIC BLOOD PRESSURE: 55 MMHG | HEIGHT: 69 IN | BODY MASS INDEX: 22.96 KG/M2 | SYSTOLIC BLOOD PRESSURE: 113 MMHG | HEART RATE: 79 BPM | WEIGHT: 155 LBS

## 2023-12-13 DIAGNOSIS — E78.2 MIXED HYPERLIPIDEMIA: ICD-10-CM

## 2023-12-13 DIAGNOSIS — F17.200 SMOKER: ICD-10-CM

## 2023-12-13 DIAGNOSIS — I21.21 STEMI INVOLVING LEFT CIRCUMFLEX CORONARY ARTERY: Primary | ICD-10-CM

## 2023-12-13 DIAGNOSIS — I34.0 NONRHEUMATIC MITRAL VALVE REGURGITATION: ICD-10-CM

## 2023-12-13 DIAGNOSIS — I73.9 PAD (PERIPHERAL ARTERY DISEASE): ICD-10-CM

## 2023-12-13 DIAGNOSIS — I50.23 ACUTE ON CHRONIC HFREF (HEART FAILURE WITH REDUCED EJECTION FRACTION): ICD-10-CM

## 2023-12-13 DIAGNOSIS — I10 PRIMARY HYPERTENSION: ICD-10-CM

## 2023-12-13 DIAGNOSIS — I50.23 ACUTE ON CHRONIC SYSTOLIC HEART FAILURE: ICD-10-CM

## 2023-12-13 PROCEDURE — 93306 TTE W/DOPPLER COMPLETE: CPT | Performed by: INTERNAL MEDICINE

## 2023-12-13 PROCEDURE — 93356 MYOCRD STRAIN IMG SPCKL TRCK: CPT | Performed by: INTERNAL MEDICINE

## 2023-12-13 RX ORDER — VARENICLINE TARTRATE 1 MG/1
1 TABLET, FILM COATED ORAL 2 TIMES DAILY
Qty: 126 TABLET | Refills: 3 | Status: SHIPPED | OUTPATIENT
Start: 2023-12-13

## 2024-01-24 ENCOUNTER — TELEPHONE (OUTPATIENT)
Dept: CARDIOLOGY | Facility: CLINIC | Age: 64
End: 2024-01-24
Payer: MEDICARE

## 2024-01-24 RX ORDER — NITROGLYCERIN 0.4 MG/1
0.4 TABLET SUBLINGUAL
Qty: 25 TABLET | Refills: 1 | Status: SHIPPED | OUTPATIENT
Start: 2024-01-24

## 2024-01-24 NOTE — TELEPHONE ENCOUNTER
Incoming Refill Request      Medication requested (name and dose):nitroglycerin 0.4 MG SL tablet    Pharmacy where request should be sent: [facility] in [place]    Additional details provided by patient:     Best call back number: [phone]    Does the patient have less than a 3 day supply:  [x] Yes  [] No    Sj Cerda  01/24/24, 12:51 EST

## 2024-01-24 NOTE — TELEPHONE ENCOUNTER
Rx Refill Note  Requested Prescriptions      No prescriptions requested or ordered in this encounter      Last office visit with prescribing clinician: 12/13/2023   Last telemedicine visit with prescribing clinician: Visit date not found   Next office visit with prescribing clinician: Visit date not found                         Would you like a call back once the refill request has been completed: [] Yes [] No    If the office needs to give you a call back, can they leave a voicemail: [] Yes [] No    Kathleen Rodriguez MA  01/24/24, 12:55 EST

## 2024-02-05 RX ORDER — FUROSEMIDE 40 MG/1
40 TABLET ORAL DAILY
Qty: 90 TABLET | Refills: 0 | Status: SHIPPED | OUTPATIENT
Start: 2024-02-05

## 2024-02-05 NOTE — TELEPHONE ENCOUNTER
Rx Refill Note  Requested Prescriptions     Pending Prescriptions Disp Refills    furosemide (LASIX) 40 MG tablet [Pharmacy Med Name: FUROSEMIDE 40 MG TABS 40 Tablet] 90 tablet 0     Sig: TAKE 1 TABLET BY MOUTH ONCE DAILY      Last office visit with prescribing clinician: 12/13/2023   Last telemedicine visit with prescribing clinician: Visit date not found   Next office visit with prescribing clinician: Unable to see future [facility] appointments. Patient to be seen around June 2024                        Would you like a call back once the refill request has been completed: [] Yes [] No    If the office needs to give you a call back, can they leave a voicemail: [] Yes [] No    Xochilt Mata MA  02/05/24, 08:44 EST

## 2024-03-11 RX ORDER — NITROGLYCERIN 0.4 MG/1
TABLET SUBLINGUAL
Qty: 25 TABLET | Refills: 0 | Status: SHIPPED | OUTPATIENT
Start: 2024-03-11

## 2024-03-11 NOTE — TELEPHONE ENCOUNTER
Rx Refill Note  Requested Prescriptions     Pending Prescriptions Disp Refills    nitroglycerin (NITROSTAT) 0.4 MG SL tablet [Pharmacy Med Name: Nitroglycerin 0.4 MG Sublingual Tablet Sublingual] 25 tablet 0     Sig: DISSOLVE ONE TABLET UNDER THE TONGUE EVERY 5 MINUTES AS NEEDED FOR CHEST PAIN.  DO NOT EXCEED A TOTAL OF 3 DOSES IN 15 MINUTES NOW      Last office visit with prescribing clinician: 12/13/2023   Last telemedicine visit with prescribing clinician: Visit date not found   Next office visit with prescribing clinician: Visit date not found                         Would you like a call back once the refill request has been completed: [] Yes [] No    If the office needs to give you a call back, can they leave a voicemail: [] Yes [] No    Siomara Rivers MA  03/11/24, 14:26 EDT

## 2024-03-21 RX ORDER — ROSUVASTATIN CALCIUM 20 MG/1
20 TABLET, COATED ORAL
Qty: 90 TABLET | Refills: 2 | Status: SHIPPED | OUTPATIENT
Start: 2024-03-21

## 2024-03-21 RX ORDER — TICAGRELOR 90 MG/1
90 TABLET ORAL 2 TIMES DAILY
Qty: 180 TABLET | Refills: 2 | Status: SHIPPED | OUTPATIENT
Start: 2024-03-21

## 2024-03-21 NOTE — TELEPHONE ENCOUNTER
Rx Refill Note  Requested Prescriptions     Pending Prescriptions Disp Refills    rosuvastatin (CRESTOR) 20 MG tablet [Pharmacy Med Name: ROSUVASTATIN 20MG TAB 20 Tablet] 30 tablet 10     Sig: TAKE 1 TABLET BY MOUTH ONCE DAILY AT BEDTIME    Brilinta 90 MG tablet tablet [Pharmacy Med Name: BRILINTA 90 MG TABS 90 Tablet] 60 tablet 10     Sig: TAKE 1 TABLET BY MOUTH TWICE DAILY      Last office visit with prescribing clinician: 12/13/2023   Last telemedicine visit with prescribing clinician: Visit date not found   Next office visit with prescribing clinician: Visit date not found                         Would you like a call back once the refill request has been completed: [] Yes [] No    If the office needs to give you a call back, can they leave a voicemail: [] Yes [] No    Kathleen Rodriguez MA  03/21/24, 09:13 EDT

## 2024-05-17 RX ORDER — METOPROLOL SUCCINATE 25 MG/1
25 TABLET, EXTENDED RELEASE ORAL DAILY
Qty: 90 TABLET | Refills: 1 | Status: SHIPPED | OUTPATIENT
Start: 2024-05-17

## 2024-05-17 RX ORDER — FUROSEMIDE 40 MG/1
40 TABLET ORAL DAILY
Qty: 90 TABLET | Refills: 1 | Status: SHIPPED | OUTPATIENT
Start: 2024-05-17

## 2024-05-17 RX ORDER — EMPAGLIFLOZIN 10 MG/1
10 TABLET, FILM COATED ORAL DAILY
Qty: 30 TABLET | Refills: 5 | Status: SHIPPED | OUTPATIENT
Start: 2024-05-17

## 2024-05-26 NOTE — PROGRESS NOTES
Encounter Date:05/29/2024        Patient ID: Eber Garza is a 63 y.o. male.      Chief Complaint:      History of Present Illness  63-year-old man with hypertension, hyperlipidemia, peripheral arterial disease, smoking presented with STEMI and was treated with primary PCI in August 2023.      Current cardiac medications include Jardiance, Lasix, lisinopril, Toprol-XL, Crestor, Brilinta, aspirin    ECG shows sinus rhythm.  Heart rate 69, NH interval 184,  and QTc 417 ms.  Unchanged when compared to prior    He denies any chest pain.  He continues to smoke a pack a day.  He does have some shortness of breath which he attributes to smoking.    The following portions of the patient's history were reviewed and updated as appropriate: allergies, current medications, past family history, past medical history, past social history, past surgical history, and problem list.    Review of Systems   Constitutional: Positive for malaise/fatigue.   Cardiovascular:  Positive for chest pain. Negative for dyspnea on exertion, leg swelling and palpitations.   Respiratory:  Positive for shortness of breath. Negative for cough.    Gastrointestinal:  Negative for abdominal pain, nausea and vomiting.   Neurological:  Positive for numbness. Negative for dizziness, focal weakness, headaches and light-headedness.   All other systems reviewed and are negative.        Current Outpatient Medications:     empagliflozin (Jardiance) 10 MG tablet tablet, TAKE 1 TABLET BY MOUTH ONCE DAILY, Disp: 30 tablet, Rfl: 5    furosemide (LASIX) 40 MG tablet, TAKE 1 TABLET BY MOUTH ONCE DAILY, Disp: 90 tablet, Rfl: 1    lisinopril (PRINIVIL,ZESTRIL) 40 MG tablet, Take 1 tablet by mouth Daily., Disp: 90 tablet, Rfl: 3    metoprolol succinate XL (TOPROL-XL) 25 MG 24 hr tablet, TAKE 1 TABLET BY MOUTH ONCE DAILY, Disp: 90 tablet, Rfl: 1    nitroglycerin (NITROSTAT) 0.4 MG SL tablet, DISSOLVE ONE TABLET UNDER THE TONGUE EVERY 5 MINUTES AS NEEDED FOR CHEST  PAIN.  DO NOT EXCEED A TOTAL OF 3 DOSES IN 15 MINUTES NOW, Disp: 25 tablet, Rfl: 0    rosuvastatin (CRESTOR) 20 MG tablet, TAKE 1 TABLET BY MOUTH ONCE DAILY AT BEDTIME, Disp: 90 tablet, Rfl: 2    ticagrelor (Brilinta) 90 MG tablet tablet, TAKE 1 TABLET BY MOUTH TWICE DAILY, Disp: 180 tablet, Rfl: 2    aspirin 81 MG chewable tablet, Chew 1 tablet Daily. (Patient not taking: Reported on 5/29/2024), Disp: 90 tablet, Rfl: 3  No current facility-administered medications for this visit.    Facility-Administered Medications Ordered in Other Visits:     Sulfur Hexafluoride Microsph (LUMASON) 60.7-25 MG IV reconstituted suspension reconstituted suspension 2 mL, 2 mL, Intravenous, Once in imaging, Francisco Moreno MD    Current outpatient and discharge medications have been reconciled for the patient.  Reviewed by: Francisco Moreno MD       No Known Allergies    Family History   Problem Relation Age of Onset    Stroke Mother     Heart attack Father        Past Surgical History:   Procedure Laterality Date    BACK SURGERY      CARDIAC CATHETERIZATION N/A 8/11/2023    Procedure: Left Heart Cath;  Surgeon: Francisco Moreno MD;  Location: [facility] CATH INVASIVE LOC[facility];  Service: Cardiovascular;  Laterality: N/A;    FRACTURE SURGERY         Past Medical History:   Diagnosis Date    Hypertension        Family History   Problem Relation Age of Onset    Stroke Mother     Heart attack Father        Social History     Socioeconomic History    Marital status: Significant Other   Tobacco Use    Smoking status: Every Day     Current packs/day: 1.00     Average packs/day: 1 pack/day for 50.0 years (50.0 ttl pk-yrs)     Types: Cigarettes     Passive exposure: Current    Smokeless tobacco: Former   Vaping Use    Vaping status: Never Used   Substance and Sexual Activity    Alcohol use: Not Currently    Drug use: Never    Sexual activity: Defer               Objective:       Physical Exam    /70 (BP Location: Right arm, Patient Position: Sitting,  "Cuff Size: Adult)   Pulse 62   Ht 175.3 cm (69\")   Wt 67.6 kg (149 lb)   SpO2 99%   BMI 22.00 kg/m²   The patient is alert, oriented and in no distress.    Vital signs as noted above.    Head and neck revealed no carotid bruits or jugular venous distension.  No thyromegaly or lymphadenopathy is present.    Lungs clear.  No wheezing.  Breath sounds are normal bilaterally.    Heart normal first and second heart sounds.  No murmur..  No pericardial rub is present.  No gallop is present.    Abdomen soft and nontender.  No organomegaly is present.    Extremities revealed good peripheral pulses without any pedal edema.    Skin warm and dry.    Musculoskeletal system is grossly normal.    CNS grossly normal.           Diagnosis Plan   1. STEMI involving left circumflex coronary artery        2. Primary hypertension        3. Mixed hyperlipidemia        4. PAD (peripheral artery disease)        5. Smoker        6. Acute on chronic systolic heart failure        7. Acute on chronic HFrEF (heart failure with reduced ejection fraction)        8. Nonrheumatic mitral valve regurgitation        LAB RESULTS (LAST 7 DAYS)    CBC        BMP        CMP         BNP        TROPONIN        CoAg        Creatinine Clearance  CrCl cannot be calculated (Patient's most recent lab result is older than the maximum 30 days allowed.).    ABG        Radiology  No radiology results for the last day    EKG  Procedures    Stress test      Echocardiogram  Results for orders placed during the hospital encounter of 08/11/23    Adult Transthoracic Echo Complete W/ Cont if Necessary Per Protocol    Interpretation Summary    Left ventricular ejection fraction appears to be less than 20%.    The left ventricular cavity is severely dilated.    Left ventricular diastolic dysfunction is noted.    The left atrial cavity is dilated.    Moderate to severe mitral valve regurgitation is present.      Cardiac catheterization  Results for orders placed during " the hospital encounter of 08/11/23    Cardiac Catheterization/Vascular Study    Conclusion  OPERATORS  Francisco Moreno M.D. (Attending Cardiologist)      PROCEDURES PERFORMED  Ultrasound guided Vascular access  Left Heart Catheterization  Coronary Angiogram 30085  PCI for STEMI, left circumflex 23321  Intravascular ultrasound of left circumflex 33410  PCI for , LAD 84443  Moderate sedation 60 minutes    INDICATIONS FOR PROCEDURE  62 years old man with no previous medical contact presented with chest pain at an outside hospital.  He was noted to have significant ST changes and was diagnosed with lateral MI.  He was urgently transferred to [facility] for further interventions.  Reasons for delay in perfusion were a) time spent in diagnosis and transfer of the patient from outside hospital to [place]; b) occlusion of right common iliac artery requiring repreparation and access of left common femoral artery.    PROCEDURE IN DETAIL  Informed consent was obtained from the patient after explaining the risks, benefits, and alternative options of the procedure. After obtaining informed consent, the patient was brought to the cath lab and was prepped in a sterile fashion. Lidocaine 2% was used for local anesthesia into the right femoral access site. The right femoral artery was accessed with a micropuncture needle via modified Seldinger technique under ultrasound guidance.  Angiogram of right common femoral artery showed complete occlusion of the right iliac.  At this point I obtained access in the left common femoral arterial site.  A long 6F destination sheath was inserted successfully. Afterwards, 6F JR4 and JL4 diagnostic catheters were advanced over a wire into the ascending aorta and were used to engage the ostia of the left main and RCA respectively. JR4 used to cross the AV and obtain LV pressures and gradient across the AV measured via pullback technique. Images of the right and left coronary systems were  obtained.    HEMODYNAMICS    LV: 117/16, 25 mmHg  AO: 119/66, 88 mmHg  Gradient    FINDINGS  Coronary Angiogram    Right dominant circulation    Left main: Left main is a large caliber vessel which gives rise to the Left Anterior Descending and the Left circumflex.  Left main is angiographically free from any significant disease    Left Anterior Descending Artery: LAD is a medium caliber vessel which gives rise to several septal perforators and several diagonal branches.  LAD is totally occluded, 100% occluded in the midsegment.  NORY 0 flow    Left Circumflex: Left circumflex artery gives rise to obtuse marginals.  Proximal left circumflex 100% occluded.  NORY 0 flow    Right Coronary Artery: The RCA is a large caliber vessel gives rise to PDA and PLV.  Mid RCA has 40 to 50% stenosis.  Distal RCA has 50 to 60% stenosis.  Distal RCA gives collaterals to LAD    Percutaneous coronary intervention  100 units/kg of heparin was administered and ACT of more than 250 was documented.  We had significant difficulty navigating the left common femoral arterial access and therefore long 6 Azeri destination sheath 45 cm was used.  A 6 Azeri XB 3.5 guide catheter was used to engage the ostium of left main coronary artery.  He was noted to have 2 occluded vessels LAD and left circumflex.  Based on the ECG left circumflex was thought to be acute and therefore first attempt was made to open the left circumflex artery.  0.014 run-through wire was advanced into the left circumflex and OM vessel.  I then predilated the vessel with 2 x 12 mm mini trek balloon at 12 chapincito.  I then advanced intravascular ultrasound catheter into the mid left circumflex and performed a slow manual pullback.  This showed mid left circumflex to be 2.5 mm in dimension and proximal left circumflex to be 3.5 mm in dimension.  IVUS also confirmed thrombotic occlusion of proximal left circumflex artery.  I then placed a 3 x 23 mm Xience oly point stent.  This was  postdilated with 3.5 x 12 mm noncompliant balloon.  Final angiography showed NORY-3 flow and 0% stenosis in left circumflex.    I then diverted my attention to LAD.  0.014 run-through wire was attempted however it would not cross the LAD occlusion which behaved like a .  I then used a 0.014 Fielder XT wire to carefully navigate the lesion.  I then used a 2 x 12 mm noncompliant balloon to perform serial inflations of mid LAD.  I could not get flow back into this vessel.  Angiogram from the right coronary artery showed collaterals to the LAD.  At this point I stopped further attempts for revascularization as LAD is a .  Final angiography continued to show 100% occlusion of LAD and NORY 0 flow.    All the catheters were exchanged over a wire and subsequently removed. Angiogram of the femoral access site was obtained and did not show complications. The patient tolerated the procedure well without any complications. The pictures were reviewed at the end of the procedure. A 6 Indian Angio-Seal closure device was applied.    ESTIMATED BLOOD LOSS:  10 ml    COMPLICATIONS:  None    PROCEDURE DATA:  Contrast Used: 100 cc  Sedation Time: 60 minutes    IMPRESSIONS  100% occlusion of LAD and left circumflex.  IVUS guided PCI with BRIT placement of culprit vessel, Lcx  Unsuccessful attempt for PCI of LAD     RECOMMENDATIONS  -Start dual antiplatelet therapy  -Start high intensity statin  -Obtain an echocardiogram  -Recommend cardiac rehab    Electronically signed by Francisco Moreno MD, 08/11/23, 7:02 AM EDT.          Assessment and Plan       Diagnoses and all orders for this visit:    1. STEMI involving left circumflex coronary artery (Primary)    2. Primary hypertension    3. Mixed hyperlipidemia    4. PAD (peripheral artery disease)    5. Smoker    6. Acute on chronic systolic heart failure    7. Acute on chronic HFrEF (heart failure with reduced ejection fraction)    8. Nonrheumatic mitral valve regurgitation          STEMI/coronary artery disease  Patient presented with acute ST elevation MI involving the lateral leads with ischemic changes in anterior and inferior leads.  After emergent discussion about risk and benefit of the procedure he was taken to the Cath Lab.  Cardiac catheterization showed 100% occlusion of LAD and circumflex.  IVUS guided PCI and BRIT placement to left circumflex was successfully performed.  Unsuccessful attempt towards  PCI of LAD which receives collaterals from the RCA.  Continue dual antiplatelet therapy, high intensity statin and beta-blocker.  Continue ACE inhibitor.  Completed cardiac rehab     Ischemic cardiomyopathy  Echocardiogram shows EF of less than 20% with severely dilated left ventricle.  He also has moderate to severe mitral regurgitation.  Continue diuretics, ACE inhibitor and beta-blocker  Continue Jardiance  We will continue to uptitrate GDMT as tolerated.  Currently limited by low blood pressure.  Short runs of nonsustained VT noted on telemetry.  Repeat echocardiogram in December 2023 shows improvement in LV function with EF of 35%  Repeat echocardiogram 6 monthly to follow-up on EF and mitral valve.  Repeat echo in June 2024.  Currently euvolemic and asymptomatic.     Mitral regurgitation  Echocardiogram shows moderate to severe mitral regurgitation  Secondary MR  Currently asymptomatic  Follow-up on serial echocardiograms; repeat echo in June 2024.  Discussed possibility of MitraClip if he became symptomatic or if EF drops..     Hypertension  Tolerating ACE inhibitor and beta-blocker.  Unable to uptitrate due to hypotension     Hyperlipidemia  Continue high intensity statin.  LDL 65, HDL 42, triglyceride 88 and total cholesterol 124  Goal LDL is less than 70     Peripheral arterial disease  During cardiac catheterization it was noted that he has complete occlusion of right common iliac.  Will need referral to vascular surgery regarding possible revascularization options as  the patient does complain of claudications.  Continue dual antiplatelet therapy, high intensity statin.     Smoker  He has been smoking a pack a day for more than 40 years.  Smoking cessation counseling provided to the patient.  He continues to smoke a pack a day.  Provided prescription for Chantix

## 2024-05-29 ENCOUNTER — OFFICE VISIT (OUTPATIENT)
Dept: CARDIOLOGY | Facility: CLINIC | Age: 64
End: 2024-05-29
Payer: MEDICARE

## 2024-05-29 VITALS
SYSTOLIC BLOOD PRESSURE: 132 MMHG | BODY MASS INDEX: 22.07 KG/M2 | DIASTOLIC BLOOD PRESSURE: 70 MMHG | HEART RATE: 62 BPM | HEIGHT: 69 IN | OXYGEN SATURATION: 99 % | WEIGHT: 149 LBS

## 2024-05-29 DIAGNOSIS — I34.0 NONRHEUMATIC MITRAL VALVE REGURGITATION: ICD-10-CM

## 2024-05-29 DIAGNOSIS — I21.21 STEMI INVOLVING LEFT CIRCUMFLEX CORONARY ARTERY: Primary | ICD-10-CM

## 2024-05-29 DIAGNOSIS — F17.200 SMOKER: ICD-10-CM

## 2024-05-29 DIAGNOSIS — E78.2 MIXED HYPERLIPIDEMIA: ICD-10-CM

## 2024-05-29 DIAGNOSIS — I10 PRIMARY HYPERTENSION: ICD-10-CM

## 2024-05-29 DIAGNOSIS — I73.9 PAD (PERIPHERAL ARTERY DISEASE): ICD-10-CM

## 2024-05-29 DIAGNOSIS — I50.23 ACUTE ON CHRONIC SYSTOLIC HEART FAILURE: ICD-10-CM

## 2024-05-29 DIAGNOSIS — I50.23 ACUTE ON CHRONIC HFREF (HEART FAILURE WITH REDUCED EJECTION FRACTION): ICD-10-CM

## 2024-06-12 ENCOUNTER — OUTSIDE FACILITY SERVICE (OUTPATIENT)
Dept: CARDIOLOGY | Facility: CLINIC | Age: 64
End: 2024-06-12
Payer: MEDICARE

## 2024-06-12 PROCEDURE — 93306 TTE W/DOPPLER COMPLETE: CPT | Performed by: INTERNAL MEDICINE

## 2024-06-12 PROCEDURE — 93356 MYOCRD STRAIN IMG SPCKL TRCK: CPT | Performed by: INTERNAL MEDICINE

## 2024-07-09 ENCOUNTER — TELEPHONE (OUTPATIENT)
Dept: CARDIOLOGY | Facility: CLINIC | Age: 64
End: 2024-07-09
Payer: MEDICARE

## 2024-07-09 NOTE — TELEPHONE ENCOUNTER
Raquel at [place] office called over. Patient called [place] asking for echo results and status of disability paperwork.

## 2024-07-18 RX ORDER — LISINOPRIL 40 MG/1
40 TABLET ORAL DAILY
Qty: 90 TABLET | Refills: 3 | Status: SHIPPED | OUTPATIENT
Start: 2024-07-18

## 2024-07-18 NOTE — TELEPHONE ENCOUNTER
Rx Refill Note  Requested Prescriptions     Pending Prescriptions Disp Refills    lisinopril (PRINIVIL,ZESTRIL) 40 MG tablet [Pharmacy Med Name: LISINOPRIL 40 MG TABS 40 Tablet] 90 tablet 3     Sig: TAKE 1 TABLET BY MOUTH ONCE DAILY      Last office visit with prescribing clinician: 5/29/2024     Next office visit with prescribing clinician: UNABLE TO SEE FUTURE SCHEDULED APPTS FOR [facility].                              Valencia Villareal MA  07/18/24, 08:50 EDT

## 2024-07-24 RX ORDER — LISINOPRIL 40 MG/1
40 TABLET ORAL DAILY
Qty: 30 TABLET | Refills: 10 | OUTPATIENT
Start: 2024-07-24

## 2024-11-13 RX ORDER — FUROSEMIDE 40 MG/1
40 TABLET ORAL DAILY
Qty: 30 TABLET | Refills: 10 | OUTPATIENT
Start: 2024-11-13

## 2024-11-13 RX ORDER — METOPROLOL SUCCINATE 25 MG/1
25 TABLET, EXTENDED RELEASE ORAL DAILY
Qty: 30 TABLET | Refills: 10 | OUTPATIENT
Start: 2024-11-13

## 2024-11-13 RX ORDER — EMPAGLIFLOZIN 10 MG/1
10 TABLET, FILM COATED ORAL DAILY
Qty: 30 TABLET | Refills: 10 | OUTPATIENT
Start: 2024-11-13

## 2024-12-13 RX ORDER — ROSUVASTATIN CALCIUM 20 MG/1
20 TABLET, COATED ORAL
Qty: 30 TABLET | Refills: 10 | OUTPATIENT
Start: 2024-12-13

## 2024-12-13 RX ORDER — TICAGRELOR 90 MG/1
90 TABLET ORAL 2 TIMES DAILY
Qty: 60 TABLET | Refills: 10 | OUTPATIENT
Start: 2024-12-13

## (undated) DEVICE — ELECTRD DEFIB M/FUNC PROPADZ RADIOL 2PK

## (undated) DEVICE — GW WHOLEY HITORQ MOD/J .035 145CM

## (undated) DEVICE — CATH DIAG IMPULSE FL4 6F 100CM

## (undated) DEVICE — NC TREK NEO™ CORONARY DILATATION CATHETER 3.50 MM X 12 MM / RAPID-EXCHANGE: Brand: NC TREK NEO™

## (undated) DEVICE — PK TRY HEART CATH 50

## (undated) DEVICE — DEV INFL COMPAK W/ACCESSPLUS IN4530

## (undated) DEVICE — CATH DIAG IMPULSE FR4 6F 100CM

## (undated) DEVICE — PINNACLE INTRODUCER SHEATH: Brand: PINNACLE

## (undated) DEVICE — DESTINATION RENAL GUIDING SHEATH: Brand: DESTINATION

## (undated) DEVICE — BOWL PLSTC MD 16OZ BLU STRL

## (undated) DEVICE — 6F .070 XB 3.5 100CM: Brand: VISTA BRITE TIP

## (undated) DEVICE — GW RUNTHROUGH NS HYPERCOAT .014 3X180CM

## (undated) DEVICE — STPCK 3WY HP ROT

## (undated) DEVICE — MINI TREK CORONARY DILATATION CATHETER 2.0 MM X 12 MM / RAPID-EXCHANGE: Brand: MINI TREK

## (undated) DEVICE — ST ACC MICROPUNCTURE STFF/CANN PLAT/TP 4F 21G 40CM

## (undated) DEVICE — ANGIO-SEAL VIP VASCULAR CLOSURE DEVICE: Brand: ANGIO-SEAL

## (undated) DEVICE — CATH IMG IVUS EAGLE EYE PLATIN RX DIGITAL .014IN 5FR

## (undated) DEVICE — CONTRST ISOVUE300 61PCT 50ML

## (undated) DEVICE — CVR PROB ULTRASND CIVFLEX GEN/PURP TELESCP/FOLD 5.5X24IN LF

## (undated) DEVICE — Device: Brand: FIELDER XT

## (undated) DEVICE — GW PTFE EMERALD HEPCOAT FC J TIP STD .035 3MM 150CM

## (undated) DEVICE — TBG NAMIC PRESS MONTR A/ F/M 12IN